# Patient Record
Sex: MALE | Race: WHITE | NOT HISPANIC OR LATINO | URBAN - METROPOLITAN AREA
[De-identification: names, ages, dates, MRNs, and addresses within clinical notes are randomized per-mention and may not be internally consistent; named-entity substitution may affect disease eponyms.]

---

## 2020-04-08 ENCOUNTER — EMERGENCY (EMERGENCY)
Facility: HOSPITAL | Age: 70
LOS: 0 days | Discharge: HOME | End: 2020-04-08
Payer: MEDICARE

## 2020-04-08 VITALS
SYSTOLIC BLOOD PRESSURE: 123 MMHG | DIASTOLIC BLOOD PRESSURE: 51 MMHG | HEART RATE: 56 BPM | OXYGEN SATURATION: 97 % | TEMPERATURE: 98 F | RESPIRATION RATE: 18 BRPM

## 2020-04-08 DIAGNOSIS — Z85.46 PERSONAL HISTORY OF MALIGNANT NEOPLASM OF PROSTATE: ICD-10-CM

## 2020-04-08 DIAGNOSIS — Z20.828 CONTACT WITH AND (SUSPECTED) EXPOSURE TO OTHER VIRAL COMMUNICABLE DISEASES: ICD-10-CM

## 2020-04-08 DIAGNOSIS — R05 COUGH: ICD-10-CM

## 2020-04-08 DIAGNOSIS — R50.9 FEVER, UNSPECIFIED: ICD-10-CM

## 2020-04-08 DIAGNOSIS — I10 ESSENTIAL (PRIMARY) HYPERTENSION: ICD-10-CM

## 2020-04-08 DIAGNOSIS — J06.9 ACUTE UPPER RESPIRATORY INFECTION, UNSPECIFIED: ICD-10-CM

## 2020-04-08 DIAGNOSIS — R09.89 OTHER SPECIFIED SYMPTOMS AND SIGNS INVOLVING THE CIRCULATORY AND RESPIRATORY SYSTEMS: ICD-10-CM

## 2020-04-08 PROCEDURE — 99282 EMERGENCY DEPT VISIT SF MDM: CPT

## 2020-04-08 PROCEDURE — 99283 EMERGENCY DEPT VISIT LOW MDM: CPT | Mod: CS

## 2020-04-08 PROCEDURE — 99283 EMERGENCY DEPT VISIT LOW MDM: CPT

## 2020-04-08 PROCEDURE — 87635 SARS-COV-2 COVID-19 AMP PRB: CPT

## 2020-04-08 NOTE — ED STATDOCS - PMH
Carpal Tunnel Syndrome    Cervical Radiculopathy    Generalized Osteoarthritis    Hypertension    Incomplete Right Bundle Branch Block    Inguinal Hernia with Gangrene, Recurrent Bilateral    Prostate Cancer    Right Inguinal Hernia

## 2020-04-08 NOTE — ED ADULT TRIAGE NOTE - CHIEF COMPLAINT QUOTE
pt presents to ED due to complaints of fever cough and possible exposure for COVID19 testing.  Patient provides verbal consent to receive results via text or email.

## 2020-04-08 NOTE — ED STATDOCS - NSFOLLOWUPINSTRUCTIONS_ED_ALL_ED_FT
How to get your Coronavirus (COVID-19) Testing Results:   Please be advised that you were tested for the coronavirus (COVID-19) in the Emergency Department at [facility].  You are to maintain self-quarantine procedures for 14 days until instructed otherwise by one of our healthcare agents. Please note that the test may take up to 2-4 days to result.  If you do not hear from us within 72 hours and you'd like to check on your results, you can call on of our coronavirus specialists at [address] [place] [address] (available 24/7).  Please DO NOT call the site where you received the test to obtain your results.     please return to the ED for severe SOB, take tylenol as needed

## 2020-04-08 NOTE — ED STATDOCS - CLINICAL SUMMARY MEDICAL DECISION MAKING FREE TEXT BOX
done
patient presents with URI symptoms, fever and concern for COVID exposure.  As patient is nontoxic appearing will test for COVID and d/c.  Quarantine reviewed and return precautions reviewed.

## 2020-04-08 NOTE — ED STATDOCS - OBJECTIVE STATEMENT
Pt presents to ED with fever, cough, runny nose, body aches, sore throat x2  days. 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

## 2020-04-08 NOTE — ED STATDOCS - PATIENT PORTAL LINK FT
You can access the FollowMyHealth Patient Portal offered by [facility] by registering at the following website: [email or web address]. By joining [facility]’s FollowMyHealth portal, you will also be able to view your health information using other applications (apps) compatible with our system.

## 2020-04-10 LAB — SARS-COV-2 RNA SPEC QL NAA+PROBE: SIGNIFICANT CHANGE UP

## 2020-11-12 ENCOUNTER — APPOINTMENT (OUTPATIENT)
Dept: CARDIOLOGY | Facility: CLINIC | Age: 70
End: 2020-11-12
Payer: MEDICARE

## 2020-11-12 ENCOUNTER — NON-APPOINTMENT (OUTPATIENT)
Age: 70
End: 2020-11-12

## 2020-11-12 VITALS
WEIGHT: 183 LBS | SYSTOLIC BLOOD PRESSURE: 144 MMHG | OXYGEN SATURATION: 99 % | BODY MASS INDEX: 26.2 KG/M2 | DIASTOLIC BLOOD PRESSURE: 71 MMHG | HEART RATE: 50 BPM | HEIGHT: 70 IN

## 2020-11-12 VITALS — SYSTOLIC BLOOD PRESSURE: 148 MMHG | DIASTOLIC BLOOD PRESSURE: 70 MMHG

## 2020-11-12 DIAGNOSIS — Z00.00 ENCOUNTER FOR GENERAL ADULT MEDICAL EXAMINATION W/OUT ABNORMAL FINDINGS: ICD-10-CM

## 2020-11-12 DIAGNOSIS — R91.8 OTHER NONSPECIFIC ABNORMAL FINDING OF LUNG FIELD: ICD-10-CM

## 2020-11-12 PROCEDURE — 93000 ELECTROCARDIOGRAM COMPLETE: CPT

## 2020-11-12 PROCEDURE — 99204 OFFICE O/P NEW MOD 45 MIN: CPT

## 2020-11-12 NOTE — REVIEW OF SYSTEMS
[Fever] : no fever [Chills] : no chills [Eyeglasses] : not currently wearing eyeglasses [Shortness Of Breath] : no shortness of breath [Chest Pain] : no chest pain [Palpitations] : no palpitations [Cough] : no cough [Heartburn] : no heartburn [Dysphagia] : no dysphagia [Urinary Frequency] : no change in urinary frequency [Impotence] : no impotence [Joint Pain] : no joint pain [Muscle Cramps] : no muscle cramps [Dizziness] : no dizziness [Convulsions] : no convulsions [Confusion] : no confusion was observed [Excessive Thirst] : no polydipsia [Easy Bleeding] : no tendency for easy bleeding

## 2020-11-12 NOTE — HISTORY OF PRESENT ILLNESS
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 patient has mild dilated aortic root which is stable on follow up CT  \par \par Patient denies any active cardiac symptoms , denies any exertional chest pain or shortness of breath , does physical work without any difficult , \par \par Patient blood pressure  is controlled   blood work showed LDL 71  HDl 44 \par \par patient had lung nodules which are stable on follow up CT  done

## 2020-11-12 NOTE — ASSESSMENT
[FreeTextEntry1] : Patient with Above hx\par \par \par Coronary atherosclerosis  : with abnormal EKG would obtain chemical nuclear stress test as patient can not run or walk fast due to ankle trauma in the past , echocardiogram to asses ventricular function \par \par Abnormal EKG   sinus bradycardia ICRBB without significant change on BB \par \par Mild MR AI TR : would obtain follow up echo to assess severity of valvular disease \par \par HTN Mild elevated  SBP encourage to follow up low salt diet , continue medication  home BP monitor recommended \par \par HLD  controlled continue current medication  LDL < 70  \par \par \par will obtain screening abdominal aorta ultrasound , carotid doppler to rule out carotid stenosis \par \par will follow up after tests \par

## 2020-11-12 NOTE — PHYSICAL EXAM
[General Appearance - Well Developed] : well developed [Normal Conjunctiva] : the conjunctiva exhibited no abnormalities [Normal Oral Mucosa] : normal oral mucosa [Normal Jugular Venous A Waves Present] : normal jugular venous A waves present [Heart Rate And Rhythm] : heart rate and rhythm were normal [Heart Sounds] : normal S1 and S2 [Murmurs] : no murmurs present [Arterial Pulses Normal] : the arterial pulses were normal [Edema] : no peripheral edema present [Veins - Varicosity Changes] : no varicosital changes were noted in the lower extremities [] : no respiratory distress [Respiration, Rhythm And Depth] : normal respiratory rhythm and effort [Exaggerated Use Of Accessory Muscles For Inspiration] : no accessory muscle use [Auscultation Breath Sounds / Voice Sounds] : lungs were clear to auscultation bilaterally [Chest Palpation] : palpation of the chest revealed no abnormalities [Lungs Percussion] : the lungs were normal to percussion [Bowel Sounds] : normal bowel sounds [Abdomen Soft] : soft [Abdomen Tenderness] : non-tender [Abdomen Mass (___ Cm)] : no abdominal mass palpated [Abnormal Walk] : normal gait [Nail Clubbing] : no clubbing of the fingernails [Cyanosis, Localized] : no localized cyanosis [Skin Color & Pigmentation] : normal skin color and pigmentation [Oriented To Time, Place, And Person] : oriented to person, place, and time [Affect] : the affect was normal

## 2020-12-01 ENCOUNTER — APPOINTMENT (OUTPATIENT)
Dept: CARDIOLOGY | Facility: CLINIC | Age: 70
End: 2020-12-01
Payer: MEDICARE

## 2020-12-01 PROCEDURE — A9500: CPT

## 2020-12-01 PROCEDURE — 93015 CV STRESS TEST SUPVJ I&R: CPT

## 2020-12-01 PROCEDURE — 78452 HT MUSCLE IMAGE SPECT MULT: CPT

## 2020-12-03 ENCOUNTER — TRANSCRIPTION ENCOUNTER (OUTPATIENT)
Age: 70
End: 2020-12-03

## 2020-12-18 ENCOUNTER — APPOINTMENT (OUTPATIENT)
Dept: CARDIOLOGY | Facility: CLINIC | Age: 70
End: 2020-12-18
Payer: MEDICARE

## 2020-12-18 PROCEDURE — 93306 TTE W/DOPPLER COMPLETE: CPT

## 2020-12-19 ENCOUNTER — APPOINTMENT (OUTPATIENT)
Dept: CARDIOLOGY | Facility: CLINIC | Age: 70
End: 2020-12-19
Payer: MEDICARE

## 2020-12-19 PROCEDURE — 93880 EXTRACRANIAL BILAT STUDY: CPT

## 2020-12-19 PROCEDURE — 93978 VASCULAR STUDY: CPT

## 2020-12-24 ENCOUNTER — TRANSCRIPTION ENCOUNTER (OUTPATIENT)
Age: 70
End: 2020-12-24

## 2020-12-27 ENCOUNTER — APPOINTMENT (OUTPATIENT)
Dept: DISASTER EMERGENCY | Facility: HOSPITAL | Age: 70
End: 2020-12-27

## 2020-12-27 ENCOUNTER — OUTPATIENT (OUTPATIENT)
Dept: OUTPATIENT SERVICES | Facility: HOSPITAL | Age: 70
LOS: 1 days | End: 2020-12-27
Payer: MEDICARE

## 2020-12-27 VITALS
OXYGEN SATURATION: 98 % | RESPIRATION RATE: 18 BRPM | TEMPERATURE: 99 F | HEART RATE: 55 BPM | DIASTOLIC BLOOD PRESSURE: 73 MMHG | SYSTOLIC BLOOD PRESSURE: 147 MMHG

## 2020-12-27 VITALS
OXYGEN SATURATION: 99 % | TEMPERATURE: 98 F | RESPIRATION RATE: 18 BRPM | SYSTOLIC BLOOD PRESSURE: 152 MMHG | HEART RATE: 60 BPM | DIASTOLIC BLOOD PRESSURE: 69 MMHG

## 2020-12-27 DIAGNOSIS — U07.1 COVID-19: ICD-10-CM

## 2020-12-27 PROCEDURE — M0239: CPT

## 2020-12-27 RX ORDER — BAMLANIVIMAB 35 MG/ML
700 INJECTION, SOLUTION INTRAVENOUS ONCE
Refills: 0 | Status: COMPLETED | OUTPATIENT
Start: 2020-12-27 | End: 2020-12-27

## 2020-12-27 RX ADMIN — BAMLANIVIMAB 200 MILLIGRAM(S): 35 INJECTION, SOLUTION INTRAVENOUS at 15:23

## 2020-12-27 NOTE — CHART NOTE - NSCHARTNOTEFT_GEN_A_CORE
CC: Monoclonal Antibody Infusion/COVID 19 Positive    70yMale with PMH HTN, prostate CA, osteoarthritis, RBBB  Symptoms: back aches, lightheadedness, nausea  Symptoms began on: 12/22/20  Positive test on: 12/22/20      Patient denies any prior COVID treatment and tx reactions  Allergies: NKDA  Medications: aspirin 81 mg po daily, HCTZ 25 MG po daily, amlodipine 10mg po daily, losartan 50mg po BID, simvastatin 20mg PO nightly, metoprolol XR 50mg po daily       exam/findings:  T(C): 36.9 (12-27-20 @ 16:30), Max: 37.4 (12-27-20 @ 15:38)  HR: 60 (12-27-20 @ 16:30) (57 - 60)  BP: 155/77 (12-27-20 @ 16:30) (149/70 - 155/77)  RR: 18 (12-27-20 @ 16:30) (18 - 18)  SpO2: 97% (12-27-20 @ 16:30) (97% - 99%)      PE:   Appearance: NAD	  HEENT:   Normal oral mucosa,   Cardiovascular: Normal S1 S2, No JVD, No murmurs, No edema  Respiratory: Lungs clear to auscultation	  Gastrointestinal:  Soft, Non-tender, + BS	  Skin: warm and dry  Neurologic: Non-focal  Extremities: Normal range of motion,    ASSESSMENT:  Pt is a 70y Male with PMH HTN, osteoarthrits, prostate ca, RBBB Covid + 12/22/20  referred by Dr Oliver who presents to infusion center for Monoclonal antibody infusion (Bamlanivimab)  Symptoms/ Criteria: body aches, nausea, nasal congestion/ age greater than 65      PLAN:  - infusion procedure explained to patient   -Consent for monoclonal antibody infusion obtained   - Risk & benifits discussed/all questions answered  -infuse  Bamlanivimab 700mg  IV over one hour   -observe patient for one hour post infusion, if stable plan to d/c home with f/u as planned per PMD      I have reviewed the Bamlanivimab Emergency Use Authorization (EUA) and I have provided the patient or patient's caregiver with the following information:      1. FDA has authorized emergency use Bamlanivimab, which is not an FDA-approved biological product.  2. The patient or patient's caregiver has the option to accept or refuse administration of Bamlanivimab.   3. The significant known and potential risks and benefits of Bamlanivimab and the extent to which such risks and benefits are unknown.  4. Information on available alternative treatments and risks and benefits of those alternatives. CC: Monoclonal Antibody Infusion/COVID 19 Positive    70yMale with PMH HTN, prostate CA, osteoarthritis, RBBB  Symptoms: back aches, lightheadedness, nausea  Symptoms began on: 12/22/20  Positive test on: 12/22/20      Patient denies any prior COVID treatment and tx reactions  Allergies: NKDA  Medications: aspirin 81 mg po daily, HCTZ 25 MG po daily, amlodipine 10mg po daily, losartan 50mg po BID, simvastatin 20mg PO nightly, metoprolol XR 50mg po daily       exam/findings:  T(C): 36.9 (12-27-20 @ 16:30), Max: 37.4 (12-27-20 @ 15:38)  HR: 60 (12-27-20 @ 16:30) (57 - 60)  BP: 155/77 (12-27-20 @ 16:30) (149/70 - 155/77)  RR: 18 (12-27-20 @ 16:30) (18 - 18)  SpO2: 97% (12-27-20 @ 16:30) (97% - 99%)      PE:   Appearance: NAD	  HEENT:   Normal oral mucosa,   Cardiovascular: Normal S1 S2, No JVD, No murmurs, No edema  Respiratory: Lungs clear to auscultation	  Gastrointestinal:  Soft, Non-tender, + BS	  Skin: warm and dry  Neurologic: Non-focal  Extremities: Normal range of motion,    ASSESSMENT:  Pt is a 70y Male with PMH HTN, osteoarthrits, prostate ca, RBBB Covid + 12/22/20  referred by Dr Oliver who presents to infusion center for Monoclonal antibody infusion (Bamlanivimab)  Symptoms/ Criteria: body aches, nausea, nasal congestion/ age greater than 65      PLAN:  - infusion procedure explained to patient   -Consent for monoclonal antibody infusion obtained   - Risk & benifits discussed/all questions answered  -infuse  Bamlanivimab 700mg  IV over one hour   -observe patient for one hour post infusion, if stable plan to d/c home with f/u as planned per PMD      I have reviewed the Bamlanivimab Emergency Use Authorization (EUA) and I have provided the patient or patient's caregiver with the following information:      1. FDA has authorized emergency use Bamlanivimab, which is not an FDA-approved biological product.  2. The patient or patient's caregiver has the option to accept or refuse administration of Bamlanivimab.   3. The significant known and potential risks and benefits of Bamlanivimab and the extent to which such risks and benefits are unknown.  4. Information on available alternative treatments and risks and benefits of those alternatives.      1736 - Bamlanivimab infusion and post infusion protocol complete  Patient tolerated well and denies any new complaints   T(C): 37.1 (12-27-20 @ 17:30), Max: 37.4 (12-27-20 @ 15:38)  HR: 55 (12-27-20 @ 17:30) (55 - 60)  BP: 147/73 (12-27-20 @ 17:30) (147/73 - 155/77)  RR: 18 (12-27-20 @ 17:30) (18 - 18)  SpO2: 98% (12-27-20 @ 17:30) (97% - 99%)      Patient tolerated infusion well and is stable for discharge home  Discharge instructions and strict return precautions reviewed with the patient who indicates understanding. All questions answered.

## 2020-12-28 ENCOUNTER — TRANSCRIPTION ENCOUNTER (OUTPATIENT)
Age: 70
End: 2020-12-28

## 2021-03-05 ENCOUNTER — NON-APPOINTMENT (OUTPATIENT)
Age: 71
End: 2021-03-05

## 2021-03-05 ENCOUNTER — APPOINTMENT (OUTPATIENT)
Dept: CARDIOLOGY | Facility: CLINIC | Age: 71
End: 2021-03-05
Payer: MEDICARE

## 2021-03-05 VITALS
WEIGHT: 180 LBS | HEART RATE: 50 BPM | SYSTOLIC BLOOD PRESSURE: 138 MMHG | DIASTOLIC BLOOD PRESSURE: 69 MMHG | TEMPERATURE: 98.2 F | HEIGHT: 70 IN | OXYGEN SATURATION: 100 % | BODY MASS INDEX: 25.77 KG/M2

## 2021-03-05 VITALS — SYSTOLIC BLOOD PRESSURE: 140 MMHG | DIASTOLIC BLOOD PRESSURE: 50 MMHG

## 2021-03-05 PROCEDURE — 93000 ELECTROCARDIOGRAM COMPLETE: CPT

## 2021-03-05 PROCEDURE — 99215 OFFICE O/P EST HI 40 MIN: CPT

## 2021-03-05 RX ORDER — LOSARTAN POTASSIUM 50 MG/1
50 TABLET, FILM COATED ORAL TWICE DAILY
Refills: 0 | Status: DISCONTINUED | COMMUNITY
End: 2021-03-05

## 2021-03-05 NOTE — ASSESSMENT
[FreeTextEntry1] : Patient with Above hx\par \par Syncopal episode in setting of alcohol intake with possible dehydrated state  ,possible  vasodepressor etiology , no evidence of immediate orthostatic hypotension ,  will obtain routine blood work ,  refer to tilt table test as patient similar prior episode , change HCTZ to 12.5 mg , decrease metoprolol to 25 mg po daily , discontinue losartan , start on valsartan 160 mg po daily , monitor bp , encourage patient to keep hydrated \par \par Coronary atherosclerosis  : normal chemical stress test , normal ventricular function \par \par Abnormal EKG   sinus bradycardia ICRBB without significant change on BB \par \par Mild MR AI TR :  mild dilated aortic size  monitor \par \par HTN Mild elevated  SBP encourage to follow up low salt diet , continue medication  home BP monitor recommended \par \par HLD  controlled continue current medication  LDL < 70  \par \par \par will obtain screening abdominal aorta ultrasound , \par

## 2021-03-05 NOTE — PHYSICAL EXAM
[General Appearance - Well Developed] : well developed [Normal Conjunctiva] : the conjunctiva exhibited no abnormalities [Normal Oral Mucosa] : normal oral mucosa [Normal Jugular Venous A Waves Present] : normal jugular venous A waves present [] : no respiratory distress [Respiration, Rhythm And Depth] : normal respiratory rhythm and effort [Exaggerated Use Of Accessory Muscles For Inspiration] : no accessory muscle use [Auscultation Breath Sounds / Voice Sounds] : lungs were clear to auscultation bilaterally [Chest Palpation] : palpation of the chest revealed no abnormalities [Lungs Percussion] : the lungs were normal to percussion [Heart Rate And Rhythm] : heart rate and rhythm were normal [Heart Sounds] : normal S1 and S2 [Murmurs] : no murmurs present [Arterial Pulses Normal] : the arterial pulses were normal [Edema] : no peripheral edema present [Veins - Varicosity Changes] : no varicosital changes were noted in the lower extremities [Bowel Sounds] : normal bowel sounds [Abdomen Soft] : soft [Abdomen Tenderness] : non-tender [Abdomen Mass (___ Cm)] : no abdominal mass palpated [Abnormal Walk] : normal gait [Nail Clubbing] : no clubbing of the fingernails [Cyanosis, Localized] : no localized cyanosis [Skin Color & Pigmentation] : normal skin color and pigmentation [Oriented To Time, Place, And Person] : oriented to person, place, and time [Affect] : the affect was normal

## 2021-03-05 NOTE — HISTORY OF PRESENT ILLNESS
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 atherosclerosis  mild dilated aortic root , coronary calcification on CT chest  who came with complain that he passed out  2 weeks ago , patient does not drink enough liquids ,  had small amount alcohol evening ,  after 20 minutes he started feeling weak and dizziness  then passed out ,  recovered immediately ,  \par \par Patient denies any associated with palpitation , chest pain or shortness of breath    patient did have similar episodes  over 20 years 4 episodes , \par \par Patient denies any active cardiac symptoms , denies any exertional chest pain or shortness of breath , does physical work without any difficult , \par \par Patient blood pressure  is controlled   blood work showed LDL 71  HDl 44 \par \par patient had lung nodules which are stable on follow up CT

## 2021-03-16 LAB
25(OH)D3 SERPL-MCNC: 34.7 NG/ML
ALBUMIN SERPL ELPH-MCNC: 4 G/DL
ALP BLD-CCNC: 90 U/L
ALT SERPL-CCNC: 18 U/L
ANION GAP SERPL CALC-SCNC: 13 MMOL/L
AST SERPL-CCNC: 25 U/L
BASOPHILS # BLD AUTO: 0.03 K/UL
BASOPHILS NFR BLD AUTO: 0.4 %
BILIRUB SERPL-MCNC: 0.4 MG/DL
BUN SERPL-MCNC: 13 MG/DL
CALCIUM SERPL-MCNC: 8.6 MG/DL
CHLORIDE SERPL-SCNC: 106 MMOL/L
CHOLEST SERPL-MCNC: 140 MG/DL
CO2 SERPL-SCNC: 23 MMOL/L
CREAT SERPL-MCNC: 0.93 MG/DL
EOSINOPHIL # BLD AUTO: 0.27 K/UL
EOSINOPHIL NFR BLD AUTO: 3.3 %
ESTIMATED AVERAGE GLUCOSE: 97 MG/DL
GLUCOSE SERPL-MCNC: 91 MG/DL
HBA1C MFR BLD HPLC: 5 %
HCT VFR BLD CALC: 38.8 %
HDLC SERPL-MCNC: 46 MG/DL
HGB BLD-MCNC: 12.7 G/DL
IMM GRANULOCYTES NFR BLD AUTO: 0.1 %
LDLC SERPL CALC-MCNC: 76 MG/DL
LYMPHOCYTES # BLD AUTO: 2.47 K/UL
LYMPHOCYTES NFR BLD AUTO: 30.1 %
MAN DIFF?: NORMAL
MCHC RBC-ENTMCNC: 31.2 PG
MCHC RBC-ENTMCNC: 32.7 GM/DL
MCV RBC AUTO: 95.3 FL
MONOCYTES # BLD AUTO: 1.19 K/UL
MONOCYTES NFR BLD AUTO: 14.5 %
NEUTROPHILS # BLD AUTO: 4.23 K/UL
NEUTROPHILS NFR BLD AUTO: 51.6 %
NONHDLC SERPL-MCNC: 94 MG/DL
PLATELET # BLD AUTO: 170 K/UL
POTASSIUM SERPL-SCNC: 4.1 MMOL/L
PROT SERPL-MCNC: 6.7 G/DL
PSA FREE FLD-MCNC: NORMAL %
PSA FREE SERPL-MCNC: <0.01 NG/ML
PSA SERPL-MCNC: <0.01 NG/ML
RBC # BLD: 4.07 M/UL
RBC # FLD: 12.8 %
SODIUM SERPL-SCNC: 142 MMOL/L
TRIGL SERPL-MCNC: 90 MG/DL
TSH SERPL-ACNC: 2.45 UIU/ML
WBC # FLD AUTO: 8.2 K/UL

## 2021-06-17 ENCOUNTER — RESULT REVIEW (OUTPATIENT)
Age: 71
End: 2021-06-17

## 2021-06-25 ENCOUNTER — APPOINTMENT (OUTPATIENT)
Dept: ELECTROPHYSIOLOGY | Facility: CLINIC | Age: 71
End: 2021-06-25
Payer: MEDICARE

## 2021-06-25 ENCOUNTER — NON-APPOINTMENT (OUTPATIENT)
Age: 71
End: 2021-06-25

## 2021-06-25 VITALS
DIASTOLIC BLOOD PRESSURE: 72 MMHG | SYSTOLIC BLOOD PRESSURE: 147 MMHG | HEART RATE: 50 BPM | BODY MASS INDEX: 24.91 KG/M2 | WEIGHT: 174 LBS | HEIGHT: 70 IN | OXYGEN SATURATION: 100 %

## 2021-06-25 DIAGNOSIS — Z78.9 OTHER SPECIFIED HEALTH STATUS: ICD-10-CM

## 2021-06-25 PROCEDURE — 99204 OFFICE O/P NEW MOD 45 MIN: CPT

## 2021-06-25 PROCEDURE — 93000 ELECTROCARDIOGRAM COMPLETE: CPT

## 2021-06-25 NOTE — HISTORY OF PRESENT ILLNESS
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 He also underwent EPS at [facility] negative conduction disease, who came with complain that he passed out  2 weeks ago , patient does not drink enough liquids ,  had small amount alcohol evening ,  after 20 minutes he started feeling weak and dizziness  then passed out ,  recovered immediately.   \par Patient denies any associated with palpitation , chest pain or shortness of breath    patient did have similar episodes  over 20 years 4 episodes , \par Patient denies any active cardiac symptoms , denies any exertional chest pain or shortness of breath , does physical work without any difficult , \par \par ECG 6/25/21 sinus christophe to 50s bpm

## 2021-06-25 NOTE — DISCUSSION/SUMMARY
[FreeTextEntry1] : 70-year-old gentleman with history of hypertension nonobstructive CAD, paroxysmal AF who has prior episodes of syncope seems to be vasovagal in nature however history is not unclear.  Long-term cardiac monitoring is reasonable option to monitor for arrhythmia symptom correlation.  Discussed Linq insertion I/R/B/a and all questions were answered.\par -chadsvasc score is 2 (age-1, htn-1) \par -cont eliquis 5mg bid\par -control HTN\par

## 2021-07-01 ENCOUNTER — OUTPATIENT (OUTPATIENT)
Dept: OUTPATIENT SERVICES | Facility: HOSPITAL | Age: 71
LOS: 1 days | Discharge: HOME | End: 2021-07-01
Payer: MEDICARE

## 2021-07-01 ENCOUNTER — TRANSCRIPTION ENCOUNTER (OUTPATIENT)
Age: 71
End: 2021-07-01

## 2021-07-01 VITALS
OXYGEN SATURATION: 99 % | RESPIRATION RATE: 16 BRPM | SYSTOLIC BLOOD PRESSURE: 155 MMHG | TEMPERATURE: 98 F | HEART RATE: 54 BPM | DIASTOLIC BLOOD PRESSURE: 65 MMHG

## 2021-07-01 VITALS
TEMPERATURE: 98 F | WEIGHT: 173.94 LBS | HEART RATE: 56 BPM | RESPIRATION RATE: 18 BRPM | SYSTOLIC BLOOD PRESSURE: 159 MMHG | OXYGEN SATURATION: 96 % | DIASTOLIC BLOOD PRESSURE: 77 MMHG | HEIGHT: 70 IN

## 2021-07-01 DIAGNOSIS — I48.0 PAROXYSMAL ATRIAL FIBRILLATION: ICD-10-CM

## 2021-07-01 PROCEDURE — 33285 INSJ SUBQ CAR RHYTHM MNTR: CPT

## 2021-07-01 PROCEDURE — C1764: CPT

## 2021-07-04 DIAGNOSIS — R55 SYNCOPE AND COLLAPSE: ICD-10-CM

## 2021-07-04 DIAGNOSIS — I77.810 THORACIC AORTIC ECTASIA: ICD-10-CM

## 2021-07-04 DIAGNOSIS — Z79.01 LONG TERM (CURRENT) USE OF ANTICOAGULANTS: ICD-10-CM

## 2021-07-04 DIAGNOSIS — R91.8 OTHER NONSPECIFIC ABNORMAL FINDING OF LUNG FIELD: ICD-10-CM

## 2021-07-04 DIAGNOSIS — I48.0 PAROXYSMAL ATRIAL FIBRILLATION: ICD-10-CM

## 2021-07-04 DIAGNOSIS — Z79.82 LONG TERM (CURRENT) USE OF ASPIRIN: ICD-10-CM

## 2021-07-04 DIAGNOSIS — I25.10 ATHEROSCLEROTIC HEART DISEASE OF NATIVE CORONARY ARTERY WITHOUT ANGINA PECTORIS: ICD-10-CM

## 2021-07-04 DIAGNOSIS — M18.11 UNILATERAL PRIMARY OSTEOARTHRITIS OF FIRST CARPOMETACARPAL JOINT, RIGHT HAND: ICD-10-CM

## 2021-07-04 DIAGNOSIS — E78.5 HYPERLIPIDEMIA, UNSPECIFIED: ICD-10-CM

## 2021-07-04 DIAGNOSIS — I10 ESSENTIAL (PRIMARY) HYPERTENSION: ICD-10-CM

## 2021-07-14 ENCOUNTER — APPOINTMENT (OUTPATIENT)
Dept: ELECTROPHYSIOLOGY | Facility: CLINIC | Age: 71
End: 2021-07-14

## 2021-07-20 ENCOUNTER — APPOINTMENT (OUTPATIENT)
Dept: ELECTROPHYSIOLOGY | Facility: CLINIC | Age: 71
End: 2021-07-20
Payer: MEDICARE

## 2021-07-20 VITALS
HEART RATE: 54 BPM | OXYGEN SATURATION: 100 % | BODY MASS INDEX: 24.91 KG/M2 | HEIGHT: 70 IN | DIASTOLIC BLOOD PRESSURE: 72 MMHG | SYSTOLIC BLOOD PRESSURE: 137 MMHG | WEIGHT: 174 LBS | RESPIRATION RATE: 14 BRPM

## 2021-07-20 PROCEDURE — 93285 PRGRMG DEV EVAL SCRMS IP: CPT

## 2021-08-23 ENCOUNTER — APPOINTMENT (OUTPATIENT)
Dept: ELECTROPHYSIOLOGY | Facility: CLINIC | Age: 71
End: 2021-08-23
Payer: MEDICARE

## 2021-08-23 ENCOUNTER — NON-APPOINTMENT (OUTPATIENT)
Age: 71
End: 2021-08-23

## 2021-08-23 PROCEDURE — 93298 REM INTERROG DEV EVAL SCRMS: CPT

## 2021-08-23 PROCEDURE — G2066: CPT

## 2021-08-30 ENCOUNTER — RX RENEWAL (OUTPATIENT)
Age: 71
End: 2021-08-30

## 2021-09-26 ENCOUNTER — APPOINTMENT (OUTPATIENT)
Dept: ELECTROPHYSIOLOGY | Facility: CLINIC | Age: 71
End: 2021-09-26
Payer: MEDICARE

## 2021-09-27 ENCOUNTER — NON-APPOINTMENT (OUTPATIENT)
Age: 71
End: 2021-09-27

## 2021-09-27 PROCEDURE — G2066: CPT

## 2021-09-27 PROCEDURE — 93298 REM INTERROG DEV EVAL SCRMS: CPT

## 2021-11-01 ENCOUNTER — APPOINTMENT (OUTPATIENT)
Dept: ELECTROPHYSIOLOGY | Facility: CLINIC | Age: 71
End: 2021-11-01
Payer: MEDICARE

## 2021-11-01 ENCOUNTER — NON-APPOINTMENT (OUTPATIENT)
Age: 71
End: 2021-11-01

## 2021-11-01 PROCEDURE — 93298 REM INTERROG DEV EVAL SCRMS: CPT

## 2021-11-01 PROCEDURE — G2066: CPT | Mod: NC

## 2021-11-05 ENCOUNTER — APPOINTMENT (OUTPATIENT)
Dept: CARDIOLOGY | Facility: CLINIC | Age: 71
End: 2021-11-05
Payer: MEDICARE

## 2021-11-05 ENCOUNTER — NON-APPOINTMENT (OUTPATIENT)
Age: 71
End: 2021-11-05

## 2021-11-05 VITALS
BODY MASS INDEX: 25.88 KG/M2 | HEIGHT: 70 IN | HEART RATE: 93 BPM | SYSTOLIC BLOOD PRESSURE: 125 MMHG | OXYGEN SATURATION: 99 % | WEIGHT: 180.78 LBS | DIASTOLIC BLOOD PRESSURE: 83 MMHG

## 2021-11-05 DIAGNOSIS — R94.31 ABNORMAL ELECTROCARDIOGRAM [ECG] [EKG]: ICD-10-CM

## 2021-11-05 PROCEDURE — 99214 OFFICE O/P EST MOD 30 MIN: CPT | Mod: 25

## 2021-11-05 PROCEDURE — 93000 ELECTROCARDIOGRAM COMPLETE: CPT

## 2021-11-05 NOTE — PHYSICAL EXAM
[General Appearance - Well Developed] : well developed [Normal Oral Mucosa] : normal oral mucosa [Normal Jugular Venous A Waves Present] : normal jugular venous A waves present [] : no respiratory distress [Respiration, Rhythm And Depth] : normal respiratory rhythm and effort [Exaggerated Use Of Accessory Muscles For Inspiration] : no accessory muscle use [Auscultation Breath Sounds / Voice Sounds] : lungs were clear to auscultation bilaterally [Chest Palpation] : palpation of the chest revealed no abnormalities [Lungs Percussion] : the lungs were normal to percussion [Heart Rate And Rhythm] : heart rate and rhythm were normal [Heart Sounds] : normal S1 and S2 [Murmurs] : no murmurs present [Arterial Pulses Normal] : the arterial pulses were normal [Edema] : no peripheral edema present [Veins - Varicosity Changes] : no varicosital changes were noted in the lower extremities [Bowel Sounds] : normal bowel sounds [Abdomen Soft] : soft [Abdomen Mass (___ Cm)] : no abdominal mass palpated [Abdomen Tenderness] : non-tender [Abnormal Walk] : normal gait [Nail Clubbing] : no clubbing of the fingernails [Cyanosis, Localized] : no localized cyanosis [Skin Color & Pigmentation] : normal skin color and pigmentation [Oriented To Time, Place, And Person] : oriented to person, place, and time [Affect] : the affect was normal [Well Developed] : well developed [Well Nourished] : well nourished [No Acute Distress] : no acute distress [Normal Conjunctiva] : normal conjunctiva [Normal Venous Pressure] : normal venous pressure [No Carotid Bruit] : no carotid bruit [No Murmur] : no murmur [No Rub] : no rub [Clear Lung Fields] : clear lung fields [Soft] : abdomen soft [Non Tender] : non-tender [No Masses/organomegaly] : no masses/organomegaly [Normal Gait] : normal gait [No Edema] : no edema [de-identified] : irreg S1,S2

## 2021-11-05 NOTE — HISTORY OF PRESENT ILLNESS
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  He states he woke up this morning feeling weak and faint.  He was informed by 's office this morning that loop monitor identified episode of afib,  Patient states he did not feel well for about a half hour.  He is feeling better now.  Offers no cp,sob,palpitations or lightheadedness.  His SBP this am was 110.  He denies drinking alcohol and remains well hydrated.  \par \par EKG: afib 102 BPM

## 2021-11-05 NOTE — ASSESSMENT
[FreeTextEntry1] : Near syncopal episode this morning: Patient noted to be in afib on EKG, rate controlled.  Recommend patient take an extra 1/2 metoprolol this morning (patient has previously been on 50mg of metoprolol however found to be bradycardic when in normal sinus rhythm).  Patient encouraged to refrain from ETOH and remain well hydrated.  Patient will follow up with  to discuss possible ablation.  \par \par Coronary atherosclerosis  : normal chemical stress test , normal ventricular function \par \par Mild MR AI TR :  mild dilated aortic size  monitor annually\par \par HTN : well controlled.  Continue BB, ARB, follow low salt diet , continue home BP monitor recommended \par \par HLD  controlled continue current medication  LDL < 70  \par \par Follow up with Dr.Palla one week \par

## 2021-11-08 ENCOUNTER — APPOINTMENT (OUTPATIENT)
Dept: CARDIOLOGY | Facility: CLINIC | Age: 71
End: 2021-11-08
Payer: MEDICARE

## 2021-11-08 VITALS
HEART RATE: 56 BPM | HEIGHT: 70 IN | DIASTOLIC BLOOD PRESSURE: 77 MMHG | OXYGEN SATURATION: 97 % | SYSTOLIC BLOOD PRESSURE: 131 MMHG | TEMPERATURE: 97.6 F

## 2021-11-08 PROCEDURE — 99214 OFFICE O/P EST MOD 30 MIN: CPT

## 2021-11-08 NOTE — ASSESSMENT
[FreeTextEntry1] : Near syncopal episode this morning: Patient noted to be in afib on EKG, rate controlled.  Recommend patient take an extra 1/2 metoprolol this morning  continue  37.5 mg po daily , (patient has previously been on 50mg of metoprolol however found to be bradycardic when in normal sinus rhythm).\par \par Patient encouraged to refrain from ETOH and remain well hydrated.  Patient will follow up with  to discuss possible ablation.  \par \par Sleep apnea  : will repeat sleep study , \par \par Coronary atherosclerosis  : normal chemical stress test , normal ventricular function \par \par Mild MR AI TR :  mild dilated aortic size  monitor annually\par \par HTN : well controlled.  Continue BB, ARB, follow low salt diet , continue home BP monitor recommended \par \par HLD  controlled continue current medication  LDL < 70  \par \par Follow up with 3 months \par

## 2021-11-08 NOTE — CARDIOLOGY SUMMARY
[de-identified] : 12/1/20 normal chemical nuclear stress test [de-identified] : 12/18/20  EF 55-60% mild dilated aortic root 3.9 cm ascending aorta 3.8 cm  mild MR

## 2021-11-08 NOTE — HISTORY OF PRESENT ILLNESS
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  His SBP this am was 110.  He denies drinking alcohol and remains well hydrated.  \par \par EKG: afib 102 BPM

## 2021-11-12 ENCOUNTER — APPOINTMENT (OUTPATIENT)
Age: 71
End: 2021-11-12
Payer: MEDICARE

## 2021-11-12 ENCOUNTER — APPOINTMENT (OUTPATIENT)
Dept: ELECTROPHYSIOLOGY | Facility: CLINIC | Age: 71
End: 2021-11-12
Payer: MEDICARE

## 2021-11-12 VITALS
HEIGHT: 70 IN | HEART RATE: 49 BPM | WEIGHT: 178 LBS | OXYGEN SATURATION: 100 % | SYSTOLIC BLOOD PRESSURE: 142 MMHG | DIASTOLIC BLOOD PRESSURE: 85 MMHG | BODY MASS INDEX: 25.48 KG/M2

## 2021-11-12 DIAGNOSIS — R55 SYNCOPE AND COLLAPSE: ICD-10-CM

## 2021-11-12 PROCEDURE — ZZZZZ: CPT

## 2021-11-12 PROCEDURE — 93285 PRGRMG DEV EVAL SCRMS IP: CPT

## 2021-11-12 PROCEDURE — 99214 OFFICE O/P EST MOD 30 MIN: CPT

## 2021-11-12 NOTE — ASSESSMENT
[FreeTextEntry1] : 71-year-old gentleman with history of hypertension dyslipidemia and nonobstructive CAD recurrent syncope paroxysmal atrial fibrillation since June 2021 requiring DC cardioversion he had EP study at [place] that was negative for SVT induction or conduction disease.  Patient has ILR.  Patient had a brief episode of rapid A. fib on 10/10/2021.  Patient had normal nuclear stress test from 2020 and that TTE that showed normal LV function mild MR.\par ILR interrogation shows  AF for 18h on 11/4/21 started at 10:53pm some RVR and avg hr 120s bpm\par PAF symptomatic will start multaq 400mg bid. Continue uninterrupted Eliquis 5 mg twice daily. take metorpolol 25mg prn for rapid af encouraged aerobic exercise. If pt has more AF episodes should consider AF ablation procedure. \par \par Total length of time spent with this patient was 30 minutes and more then half of the time was spent face to face with the patient as well as counseling and coordination of care as stated above.\par

## 2021-11-12 NOTE — CARDIOLOGY SUMMARY
[de-identified] : 12/1/20 normal chemical nuclear stress test [de-identified] : 12/18/20  EF 55-60% mild dilated aortic root 3.9 cm ascending aorta 3.8 cm  mild MR

## 2021-11-12 NOTE — HISTORY OF PRESENT ILLNESS
[FreeTextEntry1] : 71-year-old gentleman with history of hypertension dyslipidemia and nonobstructive CAD recurrent syncope paroxysmal atrial fibrillation since June 2021 requiring DC cardioversion he had EP study at [place] that was negative for SVT induction or conduction disease.  Patient has ILR. Interrogation shows AF for 18h on 11/4/21. He is compliant with eliquis 5mg bid no bleeding, he is on metoprolol 37.5mg bid. Patient had normal nuclear stress test from 2020 and that TTE that showed normal LV function mild MR.\par He is normaly sinus christophe to 50s bpm.

## 2021-11-24 ENCOUNTER — APPOINTMENT (OUTPATIENT)
Dept: CARDIOLOGY | Facility: CLINIC | Age: 71
End: 2021-11-24
Payer: MEDICARE

## 2021-11-24 VITALS
SYSTOLIC BLOOD PRESSURE: 147 MMHG | BODY MASS INDEX: 25.87 KG/M2 | WEIGHT: 180.31 LBS | DIASTOLIC BLOOD PRESSURE: 81 MMHG | OXYGEN SATURATION: 100 % | HEART RATE: 63 BPM | RESPIRATION RATE: 18 BRPM

## 2021-11-24 PROCEDURE — 99214 OFFICE O/P EST MOD 30 MIN: CPT

## 2021-11-24 PROCEDURE — 93000 ELECTROCARDIOGRAM COMPLETE: CPT

## 2021-11-24 RX ORDER — METOPROLOL TARTRATE 37.5 MG/1
37.5 TABLET, FILM COATED ORAL DAILY
Refills: 0 | Status: DISCONTINUED | COMMUNITY
End: 2021-11-24

## 2021-11-24 NOTE — CARDIOLOGY SUMMARY
[de-identified] : 12/1/20 normal chemical nuclear stress test [de-identified] : 12/18/20  EF 55-60% mild dilated aortic root 3.9 cm ascending aorta 3.8 cm  mild MR

## 2021-11-24 NOTE — HISTORY OF PRESENT ILLNESS
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 Pt followed up with recent EP evaluation with Dr. Ramirez who recommended dronedarone and possible ablation. \par \par Patient has hx of sleep apnea  his cpap remains non functional. \par \par He denies chest pain or shortness of breath. He recalls that when he lifts heavy objects or becomes stressed he has an exacerbation of atrial fibrillation.

## 2021-11-24 NOTE — REVIEW OF SYSTEMS
Critical Care Time (RN) Mins: 180 minutes for one RN and during about 90 minutes to RNs.   [Negative] : Heme/Lymph

## 2021-11-24 NOTE — DISCUSSION/SUMMARY
[Atrial Fibrillation] : atrial fibrillation [Stable] : stable [Hypertension] : hypertension [Not Responding to Treatment] : not responding to treatment [Patient] : the patient [FreeTextEntry1] : I agree with current EP plan. Explained atrial fibrillation and therapeutic alternatives including ablation. Pt will continue metoprolol succ 25 mg daily He will continue dronedarone. He will use metoprolol tartate prn atrial fibrillation. He will follow up with Dr. Palla. He will reduce simvastatin. Echo ordered to evaluate aortic dimensions. Pt will observe a low salt diet as possible.

## 2021-11-28 LAB
25(OH)D3 SERPL-MCNC: 37.1 NG/ML
ALBUMIN SERPL ELPH-MCNC: 4.2 G/DL
ALP BLD-CCNC: 83 U/L
ALT SERPL-CCNC: 17 U/L
ANION GAP SERPL CALC-SCNC: 12 MMOL/L
AST SERPL-CCNC: 25 U/L
BASOPHILS # BLD AUTO: 0.06 K/UL
BASOPHILS NFR BLD AUTO: 0.7 %
BILIRUB SERPL-MCNC: 0.7 MG/DL
BUN SERPL-MCNC: 17 MG/DL
CALCIUM SERPL-MCNC: 9.2 MG/DL
CHLORIDE SERPL-SCNC: 104 MMOL/L
CHOLEST SERPL-MCNC: 142 MG/DL
CO2 SERPL-SCNC: 24 MMOL/L
CREAT SERPL-MCNC: 1.03 MG/DL
EOSINOPHIL # BLD AUTO: 0.34 K/UL
EOSINOPHIL NFR BLD AUTO: 4 %
ESTIMATED AVERAGE GLUCOSE: 100 MG/DL
GLUCOSE SERPL-MCNC: 85 MG/DL
HBA1C MFR BLD HPLC: 5.1 %
HCT VFR BLD CALC: 42.1 %
HDLC SERPL-MCNC: 48 MG/DL
HGB BLD-MCNC: 13.9 G/DL
IMM GRANULOCYTES NFR BLD AUTO: 0.4 %
LDLC SERPL CALC-MCNC: 75 MG/DL
LYMPHOCYTES # BLD AUTO: 2.76 K/UL
LYMPHOCYTES NFR BLD AUTO: 32.4 %
MAGNESIUM SERPL-MCNC: 2.3 MG/DL
MAN DIFF?: NORMAL
MCHC RBC-ENTMCNC: 31.7 PG
MCHC RBC-ENTMCNC: 33 GM/DL
MCV RBC AUTO: 96.1 FL
MONOCYTES # BLD AUTO: 1.06 K/UL
MONOCYTES NFR BLD AUTO: 12.4 %
NEUTROPHILS # BLD AUTO: 4.27 K/UL
NEUTROPHILS NFR BLD AUTO: 50.1 %
NONHDLC SERPL-MCNC: 94 MG/DL
PLATELET # BLD AUTO: 183 K/UL
POTASSIUM SERPL-SCNC: 4.1 MMOL/L
PROT SERPL-MCNC: 6.6 G/DL
RBC # BLD: 4.38 M/UL
RBC # FLD: 13 %
SODIUM SERPL-SCNC: 140 MMOL/L
TRIGL SERPL-MCNC: 94 MG/DL
TSH SERPL-ACNC: 2.3 UIU/ML
WBC # FLD AUTO: 8.52 K/UL

## 2021-12-01 ENCOUNTER — APPOINTMENT (OUTPATIENT)
Age: 71
End: 2021-12-01
Payer: MEDICARE

## 2021-12-01 ENCOUNTER — OUTPATIENT (OUTPATIENT)
Dept: OUTPATIENT SERVICES | Facility: HOSPITAL | Age: 71
LOS: 1 days | End: 2021-12-01
Payer: MEDICARE

## 2021-12-01 DIAGNOSIS — G47.33 OBSTRUCTIVE SLEEP APNEA (ADULT) (PEDIATRIC): ICD-10-CM

## 2021-12-01 PROCEDURE — G0399: CPT | Mod: 26

## 2021-12-01 PROCEDURE — 95806 SLEEP STUDY UNATT&RESP EFFT: CPT

## 2021-12-08 ENCOUNTER — APPOINTMENT (OUTPATIENT)
Dept: ELECTROPHYSIOLOGY | Facility: CLINIC | Age: 71
End: 2021-12-08
Payer: MEDICARE

## 2021-12-08 ENCOUNTER — NON-APPOINTMENT (OUTPATIENT)
Age: 71
End: 2021-12-08

## 2021-12-08 PROCEDURE — G2066: CPT

## 2021-12-08 PROCEDURE — 93298 REM INTERROG DEV EVAL SCRMS: CPT

## 2021-12-22 ENCOUNTER — OUTPATIENT (OUTPATIENT)
Dept: OUTPATIENT SERVICES | Facility: HOSPITAL | Age: 71
LOS: 1 days | End: 2021-12-22
Payer: MEDICARE

## 2021-12-22 ENCOUNTER — APPOINTMENT (OUTPATIENT)
Age: 71
End: 2021-12-22
Payer: MEDICARE

## 2021-12-22 DIAGNOSIS — G47.33 OBSTRUCTIVE SLEEP APNEA (ADULT) (PEDIATRIC): ICD-10-CM

## 2021-12-22 PROCEDURE — 95810 POLYSOM 6/> YRS 4/> PARAM: CPT

## 2021-12-22 PROCEDURE — 95810 POLYSOM 6/> YRS 4/> PARAM: CPT | Mod: 26

## 2022-01-10 ENCOUNTER — NON-APPOINTMENT (OUTPATIENT)
Age: 72
End: 2022-01-10

## 2022-01-11 ENCOUNTER — APPOINTMENT (OUTPATIENT)
Dept: ELECTROPHYSIOLOGY | Facility: CLINIC | Age: 72
End: 2022-01-11
Payer: MEDICARE

## 2022-01-11 PROCEDURE — G2066: CPT

## 2022-01-11 PROCEDURE — 93298 REM INTERROG DEV EVAL SCRMS: CPT

## 2022-02-02 ENCOUNTER — NON-APPOINTMENT (OUTPATIENT)
Age: 72
End: 2022-02-02

## 2022-02-02 ENCOUNTER — APPOINTMENT (OUTPATIENT)
Dept: ELECTROPHYSIOLOGY | Facility: CLINIC | Age: 72
End: 2022-02-02
Payer: MEDICARE

## 2022-02-02 VITALS
RESPIRATION RATE: 14 BRPM | OXYGEN SATURATION: 100 % | SYSTOLIC BLOOD PRESSURE: 124 MMHG | HEART RATE: 53 BPM | BODY MASS INDEX: 25.77 KG/M2 | DIASTOLIC BLOOD PRESSURE: 49 MMHG | HEIGHT: 70 IN | WEIGHT: 180 LBS

## 2022-02-02 PROCEDURE — 99212 OFFICE O/P EST SF 10 MIN: CPT

## 2022-02-02 PROCEDURE — 93285 PRGRMG DEV EVAL SCRMS IP: CPT

## 2022-02-02 RX ORDER — SIMVASTATIN 20 MG/1
20 TABLET, FILM COATED ORAL DAILY
Qty: 90 | Refills: 3 | Status: DISCONTINUED | COMMUNITY
End: 2022-02-02

## 2022-02-02 RX ORDER — DOXAZOSIN 1 MG/1
1 TABLET ORAL DAILY
Refills: 0 | Status: DISCONTINUED | COMMUNITY
End: 2022-02-02

## 2022-02-02 RX ORDER — METOPROLOL TARTRATE 25 MG/1
25 TABLET, FILM COATED ORAL DAILY
Qty: 60 | Refills: 0 | Status: DISCONTINUED | COMMUNITY
Start: 2021-11-12 | End: 2022-02-02

## 2022-02-02 NOTE — ASSESSMENT
[FreeTextEntry1] : ILR reveals NSR.\par PAF burden <0.1%\par Last episode on 1/15/22 for 2 min duration.\par No other events recorded.

## 2022-02-02 NOTE — DISCUSSION/SUMMARY
[___ Month(s)] : in [unfilled] month(s) [FreeTextEntry1] : Continue Eliquis 5 mg BID, Metorpolol Succinate 12.5 mg daily, Multaq 400 mg BID.\par Continue monthly remote monitoring\par

## 2022-02-02 NOTE — HISTORY OF PRESENT ILLNESS
[FreeTextEntry1] : 71-year-old gentleman with history of hypertension dyslipidemia and nonobstructive CAD recurrent syncope paroxysmal atrial fibrillation since June 2021 requiring DC cardioversion, he had EP study at [facility] that was negative for SVT induction or conduction disease.  Patient has ILR. Interrogation shows AF for 18h on 11/4/21. He is compliant with eliquis 5mg bid no bleeding, he is on metoprolol 37.5mg bid. Patient had normal nuclear stress test from 2020 and that TTE that showed normal LV function mild MR.\par He is normally sinus christophe to 50s bpm.

## 2022-02-23 RX ORDER — METOPROLOL TARTRATE 25 MG/1
25 TABLET, FILM COATED ORAL
Refills: 0 | Status: DISCONTINUED | COMMUNITY
End: 2022-02-23

## 2022-03-08 ENCOUNTER — NON-APPOINTMENT (OUTPATIENT)
Age: 72
End: 2022-03-08

## 2022-03-08 ENCOUNTER — APPOINTMENT (OUTPATIENT)
Dept: ELECTROPHYSIOLOGY | Facility: CLINIC | Age: 72
End: 2022-03-08
Payer: MEDICARE

## 2022-03-08 PROCEDURE — G2066: CPT

## 2022-03-08 PROCEDURE — 93298 REM INTERROG DEV EVAL SCRMS: CPT

## 2022-03-23 ENCOUNTER — APPOINTMENT (OUTPATIENT)
Dept: UROLOGY | Facility: CLINIC | Age: 72
End: 2022-03-23
Payer: MEDICARE

## 2022-03-23 VITALS
BODY MASS INDEX: 26.48 KG/M2 | TEMPERATURE: 98.3 F | HEIGHT: 70 IN | OXYGEN SATURATION: 99 % | SYSTOLIC BLOOD PRESSURE: 147 MMHG | DIASTOLIC BLOOD PRESSURE: 82 MMHG | HEART RATE: 60 BPM | WEIGHT: 185 LBS

## 2022-03-23 DIAGNOSIS — Z85.46 PERSONAL HISTORY OF MALIGNANT NEOPLASM OF PROSTATE: ICD-10-CM

## 2022-03-23 DIAGNOSIS — Z80.52 FAMILY HISTORY OF MALIGNANT NEOPLASM OF BLADDER: ICD-10-CM

## 2022-03-23 DIAGNOSIS — Z86.79 PERSONAL HISTORY OF OTHER DISEASES OF THE CIRCULATORY SYSTEM: ICD-10-CM

## 2022-03-23 DIAGNOSIS — R31.0 GROSS HEMATURIA: ICD-10-CM

## 2022-03-23 PROCEDURE — 99205 OFFICE O/P NEW HI 60 MIN: CPT

## 2022-03-23 NOTE — LETTER BODY
[Dear  ___] : Dear  [unfilled], [Consult Letter:] : I had the pleasure of evaluating your patient, [unfilled]. [Please see my note below.] : Please see my note below. [Consult Closing:] : Thank you very much for allowing me to participate in the care of this patient.  If you have any questions, please do not hesitate to contact me. [Sincerely,] : Sincerely, [FreeTextEntry3] : Taj Chen MD\par

## 2022-03-23 NOTE — PHYSICAL EXAM
[General Appearance - Well Developed] : well developed [General Appearance - Well Nourished] : well nourished [Normal Appearance] : normal appearance [Well Groomed] : well groomed [General Appearance - In No Acute Distress] : no acute distress [Edema] : no peripheral edema [Respiration, Rhythm And Depth] : normal respiratory rhythm and effort [Exaggerated Use Of Accessory Muscles For Inspiration] : no accessory muscle use [Abdomen Soft] : soft [Abdomen Tenderness] : non-tender [Costovertebral Angle Tenderness] : no ~M costovertebral angle tenderness [Penis Abnormality] : normal circumcised penis [Urinary Bladder Findings] : the bladder was normal on palpation [Scrotum] : the scrotum was normal [Epididymis] : the epididymides were normal [Testes Tenderness] : no tenderness of the testes [Testes Mass (___cm)] : there were no testicular masses [No Prostate Nodules] : no prostate nodules [Normal Station and Gait] : the gait and station were normal for the patient's age [] : no rash [No Focal Deficits] : no focal deficits [Oriented To Time, Place, And Person] : oriented to person, place, and time [Affect] : the affect was normal [Mood] : the mood was normal [Not Anxious] : not anxious [No Palpable Adenopathy] : no palpable adenopathy [FreeTextEntry1] : coronal Hypospadius, meatal stenosis

## 2022-03-23 NOTE — REVIEW OF SYSTEMS
[Palpitations] : palpitations [Seen by urologist before (Name)  ___] : Preciously seen by a urologist: [unfilled] [Blood in urine that you can see] : blood visible in urine [Negative] : Heme/Lymph

## 2022-03-23 NOTE — HISTORY OF PRESENT ILLNESS
[FreeTextEntry1] : 3/23/2022: Charles Bardales is a 71 year old male that has terminal gross painless intermittent hematuria for the last one year. 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 . 6 months ago A-Fib and started on Eliquis. 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 cystic mass adjacent to the left lateral wall of the bladder; possible lymphocele. Voiding well; has coronal Hypospadius with materies stenosis. Urine cytology negative for high grade malignancy. Will schedule for meatal dilation, cystoscopy and possible biopsy and fulguration in 2 months. Patient is visiting [facility]. Will stop Eliquis 3 days prior to procedure.

## 2022-04-06 ENCOUNTER — APPOINTMENT (OUTPATIENT)
Dept: UROLOGY | Facility: CLINIC | Age: 72
End: 2022-04-06
Payer: MEDICARE

## 2022-04-06 VITALS
HEART RATE: 60 BPM | TEMPERATURE: 98.3 F | OXYGEN SATURATION: 99 % | DIASTOLIC BLOOD PRESSURE: 65 MMHG | SYSTOLIC BLOOD PRESSURE: 136 MMHG

## 2022-04-06 PROCEDURE — 99213 OFFICE O/P EST LOW 20 MIN: CPT | Mod: 25

## 2022-04-06 PROCEDURE — 52224Z: CUSTOM

## 2022-04-06 NOTE — HISTORY OF PRESENT ILLNESS
[FreeTextEntry1] : 3/23/2022: Charles Bardales is a 71 year old male that has terminal gross painless intermittent hematuria for the last one year. 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 . 6 months ago A-Fib and started on Eliquis. 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 cystic mass adjacent to the left lateral wall of the bladder; possible lymphocele. Voiding well; has coronal Hypospadius with materies stenosis. Urine cytology negative for high grade malignancy. Will schedule for meatal dilation, cystoscopy and possible biopsy and fulguration in 2 months. Patient is visiting [facility]. Will stop Eliquis 3 days prior to procedure.

## 2022-04-06 NOTE — PHYSICAL EXAM
[General Appearance - Well Developed] : well developed [General Appearance - Well Nourished] : well nourished [Normal Appearance] : normal appearance [Well Groomed] : well groomed [General Appearance - In No Acute Distress] : no acute distress [Abdomen Soft] : soft [Abdomen Tenderness] : non-tender [Costovertebral Angle Tenderness] : no ~M costovertebral angle tenderness [Penis Abnormality] : normal circumcised penis [Urinary Bladder Findings] : the bladder was normal on palpation [Scrotum] : the scrotum was normal [Epididymis] : the epididymides were normal [Testes Tenderness] : no tenderness of the testes [Testes Mass (___cm)] : there were no testicular masses [No Prostate Nodules] : no prostate nodules [Edema] : no peripheral edema [] : no respiratory distress [Respiration, Rhythm And Depth] : normal respiratory rhythm and effort [Exaggerated Use Of Accessory Muscles For Inspiration] : no accessory muscle use [Oriented To Time, Place, And Person] : oriented to person, place, and time [Affect] : the affect was normal [Mood] : the mood was normal [Not Anxious] : not anxious [Normal Station and Gait] : the gait and station were normal for the patient's age [No Focal Deficits] : no focal deficits [No Palpable Adenopathy] : no palpable adenopathy [FreeTextEntry1] : coronal Hypospadius, meatal stenosis

## 2022-04-08 LAB — CORE LAB BIOPSY: NORMAL

## 2022-04-12 ENCOUNTER — NON-APPOINTMENT (OUTPATIENT)
Age: 72
End: 2022-04-12

## 2022-04-12 ENCOUNTER — APPOINTMENT (OUTPATIENT)
Dept: ELECTROPHYSIOLOGY | Facility: CLINIC | Age: 72
End: 2022-04-12
Payer: MEDICARE

## 2022-04-12 PROCEDURE — G2066: CPT

## 2022-04-12 PROCEDURE — 93298 REM INTERROG DEV EVAL SCRMS: CPT

## 2022-05-01 ENCOUNTER — NON-APPOINTMENT (OUTPATIENT)
Age: 72
End: 2022-05-01

## 2022-05-09 ENCOUNTER — APPOINTMENT (OUTPATIENT)
Dept: CARDIOLOGY | Facility: CLINIC | Age: 72
End: 2022-05-09
Payer: MEDICARE

## 2022-05-09 ENCOUNTER — OUTPATIENT (OUTPATIENT)
Dept: OUTPATIENT SERVICES | Facility: HOSPITAL | Age: 72
LOS: 1 days | Discharge: HOME | End: 2022-05-09
Payer: MEDICARE

## 2022-05-09 VITALS
HEIGHT: 70 IN | RESPIRATION RATE: 14 BRPM | HEART RATE: 64 BPM | TEMPERATURE: 98.2 F | BODY MASS INDEX: 26.2 KG/M2 | WEIGHT: 183 LBS | DIASTOLIC BLOOD PRESSURE: 73 MMHG | OXYGEN SATURATION: 97 % | SYSTOLIC BLOOD PRESSURE: 129 MMHG

## 2022-05-09 VITALS
TEMPERATURE: 97 F | OXYGEN SATURATION: 98 % | WEIGHT: 183.65 LBS | DIASTOLIC BLOOD PRESSURE: 63 MMHG | RESPIRATION RATE: 17 BRPM | HEART RATE: 64 BPM | SYSTOLIC BLOOD PRESSURE: 126 MMHG | HEIGHT: 70 IN

## 2022-05-09 DIAGNOSIS — Z01.810 ENCOUNTER FOR PREPROCEDURAL CARDIOVASCULAR EXAMINATION: ICD-10-CM

## 2022-05-09 DIAGNOSIS — Z98.890 OTHER SPECIFIED POSTPROCEDURAL STATES: Chronic | ICD-10-CM

## 2022-05-09 DIAGNOSIS — Z01.818 ENCOUNTER FOR OTHER PREPROCEDURAL EXAMINATION: ICD-10-CM

## 2022-05-09 DIAGNOSIS — C67.9 MALIGNANT NEOPLASM OF BLADDER, UNSPECIFIED: ICD-10-CM

## 2022-05-09 LAB
ANION GAP SERPL CALC-SCNC: 5 MMOL/L — SIGNIFICANT CHANGE UP (ref 5–17)
APPEARANCE UR: ABNORMAL
APTT BLD: 35.7 SEC — HIGH (ref 27.5–35.5)
BASOPHILS # BLD AUTO: 0.03 K/UL — SIGNIFICANT CHANGE UP (ref 0–0.2)
BASOPHILS NFR BLD AUTO: 0.4 % — SIGNIFICANT CHANGE UP (ref 0–2)
BILIRUB UR-MCNC: NEGATIVE — SIGNIFICANT CHANGE UP
BUN SERPL-MCNC: 18 MG/DL — SIGNIFICANT CHANGE UP (ref 7–23)
CALCIUM SERPL-MCNC: 8.8 MG/DL — SIGNIFICANT CHANGE UP (ref 8.5–10.1)
CHLORIDE SERPL-SCNC: 106 MMOL/L — SIGNIFICANT CHANGE UP (ref 96–108)
CO2 SERPL-SCNC: 29 MMOL/L — SIGNIFICANT CHANGE UP (ref 22–31)
COLOR SPEC: ABNORMAL
CREAT SERPL-MCNC: 0.95 MG/DL — SIGNIFICANT CHANGE UP (ref 0.5–1.3)
DIFF PNL FLD: ABNORMAL
EGFR: 85 ML/MIN/1.73M2 — SIGNIFICANT CHANGE UP
EOSINOPHIL # BLD AUTO: 0.31 K/UL — SIGNIFICANT CHANGE UP (ref 0–0.5)
EOSINOPHIL NFR BLD AUTO: 4.1 % — SIGNIFICANT CHANGE UP (ref 0–6)
FLUAV AG NPH QL: SIGNIFICANT CHANGE UP
FLUBV AG NPH QL: SIGNIFICANT CHANGE UP
GLUCOSE SERPL-MCNC: 80 MG/DL — SIGNIFICANT CHANGE UP (ref 70–99)
GLUCOSE UR QL: NEGATIVE MG/DL — SIGNIFICANT CHANGE UP
HCT VFR BLD CALC: 36.9 % — LOW (ref 39–50)
HGB BLD-MCNC: 12.5 G/DL — LOW (ref 13–17)
IMM GRANULOCYTES NFR BLD AUTO: 0.3 % — SIGNIFICANT CHANGE UP (ref 0–1.5)
INR BLD: 1.14 RATIO — SIGNIFICANT CHANGE UP (ref 0.88–1.16)
KETONES UR-MCNC: ABNORMAL
LEUKOCYTE ESTERASE UR-ACNC: ABNORMAL
LYMPHOCYTES # BLD AUTO: 2.43 K/UL — SIGNIFICANT CHANGE UP (ref 1–3.3)
LYMPHOCYTES # BLD AUTO: 32.3 % — SIGNIFICANT CHANGE UP (ref 13–44)
MCHC RBC-ENTMCNC: 31.3 PG — SIGNIFICANT CHANGE UP (ref 27–34)
MCHC RBC-ENTMCNC: 33.9 G/DL — SIGNIFICANT CHANGE UP (ref 32–36)
MCV RBC AUTO: 92.3 FL — SIGNIFICANT CHANGE UP (ref 80–100)
MONOCYTES # BLD AUTO: 0.87 K/UL — SIGNIFICANT CHANGE UP (ref 0–0.9)
MONOCYTES NFR BLD AUTO: 11.6 % — SIGNIFICANT CHANGE UP (ref 2–14)
NEUTROPHILS # BLD AUTO: 3.87 K/UL — SIGNIFICANT CHANGE UP (ref 1.8–7.4)
NEUTROPHILS NFR BLD AUTO: 51.3 % — SIGNIFICANT CHANGE UP (ref 43–77)
NITRITE UR-MCNC: NEGATIVE — SIGNIFICANT CHANGE UP
NRBC # BLD: 0 /100 WBCS — SIGNIFICANT CHANGE UP (ref 0–0)
PH UR: 5 — SIGNIFICANT CHANGE UP (ref 5–8)
PLATELET # BLD AUTO: 180 K/UL — SIGNIFICANT CHANGE UP (ref 150–400)
POTASSIUM SERPL-MCNC: 3.6 MMOL/L — SIGNIFICANT CHANGE UP (ref 3.5–5.3)
POTASSIUM SERPL-SCNC: 3.6 MMOL/L — SIGNIFICANT CHANGE UP (ref 3.5–5.3)
PROT UR-MCNC: 100 MG/DL
PROTHROM AB SERPL-ACNC: 13.7 SEC — HIGH (ref 10.5–13.4)
RBC # BLD: 4 M/UL — LOW (ref 4.2–5.8)
RBC # FLD: 12.3 % — SIGNIFICANT CHANGE UP (ref 10.3–14.5)
SARS-COV-2 RNA SPEC QL NAA+PROBE: SIGNIFICANT CHANGE UP
SODIUM SERPL-SCNC: 140 MMOL/L — SIGNIFICANT CHANGE UP (ref 135–145)
SP GR SPEC: 1.02 — SIGNIFICANT CHANGE UP (ref 1.01–1.02)
UROBILINOGEN FLD QL: NEGATIVE MG/DL — SIGNIFICANT CHANGE UP
WBC # BLD: 7.53 K/UL — SIGNIFICANT CHANGE UP (ref 3.8–10.5)
WBC # FLD AUTO: 7.53 K/UL — SIGNIFICANT CHANGE UP (ref 3.8–10.5)

## 2022-05-09 PROCEDURE — 99215 OFFICE O/P EST HI 40 MIN: CPT

## 2022-05-09 PROCEDURE — 93010 ELECTROCARDIOGRAM REPORT: CPT

## 2022-05-09 RX ORDER — HYDROCHLOROTHIAZIDE 25 MG/1
25 TABLET ORAL DAILY
Qty: 90 | Refills: 1 | Status: ACTIVE | COMMUNITY

## 2022-05-09 RX ORDER — DOXAZOSIN 2 MG/1
2 TABLET ORAL
Qty: 180 | Refills: 0 | Status: ACTIVE | COMMUNITY

## 2022-05-09 RX ORDER — VALSARTAN 160 MG/1
160 TABLET, COATED ORAL
Qty: 180 | Refills: 2 | Status: ACTIVE | COMMUNITY
Start: 2021-03-05

## 2022-05-09 RX ORDER — DRONEDARONE 400 MG/1
400 TABLET, FILM COATED ORAL TWICE DAILY
Qty: 180 | Refills: 1 | Status: ACTIVE | COMMUNITY
Start: 2021-11-12

## 2022-05-09 RX ORDER — APIXABAN 5 MG/1
5 TABLET, FILM COATED ORAL
Qty: 180 | Refills: 1 | Status: ACTIVE | COMMUNITY

## 2022-05-09 RX ORDER — SIMVASTATIN 10 MG/1
10 TABLET, FILM COATED ORAL DAILY
Qty: 90 | Refills: 3 | Status: ACTIVE | COMMUNITY

## 2022-05-09 NOTE — H&P PST ADULT - EYES
69 year old male who sustained a right hip fracture s/p right hip hermiathroplasty on February 5th who developed colonic ileus. His symptom have waxed and waned but continues to have abdominal distension prompting colorectal surgery consult. He has undergone evaluation with CT scans as well as barium enema which shows no evidence of obstruction. He also responded to prior does of neostigmine. He just returned from colonoscopy with decompression and placement of a rectal tube. He denies abdominal pain and reports improvement in distension.     PAST MEDICAL & SURGICAL HISTORY:  Spinal stenosis  CHF (congestive heart failure)  Emphysema  COPD (chronic obstructive pulmonary disease)  Right hip surgery    MEDICATIONS  (STANDING):  amLODIPine   Tablet 5 milliGRAM(s) Oral daily  ascorbic acid 500 milliGRAM(s) Oral two times a day  atorvastatin 20 milliGRAM(s) Oral at bedtime  ciprofloxacin   IVPB 400 milliGRAM(s) IV Intermittent every 12 hours  ciprofloxacin   IVPB      dextrose 5% + sodium chloride 0.45%. 1000 milliLiter(s) (100 mL/Hr) IV Continuous <Continuous>  docusate sodium 100 milliGRAM(s) Oral three times a day  enoxaparin Injectable 40 milliGRAM(s) SubCutaneous every 24 hours  finasteride 5 milliGRAM(s) Oral daily  folic acid 1 milliGRAM(s) Oral daily  isosorbide   mononitrate ER Tablet (IMDUR) 60 milliGRAM(s) Oral daily  lidocaine   Patch 1 Patch Transdermal daily  lisinopril 20 milliGRAM(s) Oral daily  metoprolol succinate ER 25 milliGRAM(s) Oral daily  metroNIDAZOLE  IVPB 500 milliGRAM(s) IV Intermittent every 8 hours  multivitamin 1 Tablet(s) Oral daily  pantoprazole    Tablet 40 milliGRAM(s) Oral daily  ranolazine 500 milliGRAM(s) Oral two times a day  tamsulosin 0.4 milliGRAM(s) Oral at bedtime  tiotropium 18 MICROgram(s) Capsule 1 Capsule(s) Inhalation daily    Allergies: No Known Allergies    Exam:  Vital Signs Last 24 Hrs  T(C): 36.6 (13 Feb 2018 11:00), Max: 36.9 (12 Feb 2018 19:17)  T(F): 97.8 (13 Feb 2018 11:00), Max: 98.5 (12 Feb 2018 19:17)  HR: 96 (13 Feb 2018 11:00) (84 - 98)  BP: 115/60 (13 Feb 2018 11:00) (115/60 - 136/62)  BP(mean): 74 (13 Feb 2018 11:00) (68 - 76)  RR: 17 (13 Feb 2018 11:00) (17 - 22)  SpO2: 95% (13 Feb 2018 11:00) (91% - 95%)    General: in no distress  HEENT: normocephalic, atraumatic  Respiratory: Non labored breathing  Cardiovascular: regular rate and rhythm  Abdomen:  Rectal: rectal tube in place  Extremities  Skin: warm and dry  Neuro: alert and oriented x 3                          11.9   23.7  )-----------( 611      ( 13 Feb 2018 07:42 )             36.7   02-13    137  |  99  |  22  ----------------------------<  131<H>  3.8   |  33<H>  |  0.63    Ca    8.7      13 Feb 2018 07:42    TPro  5.8<L>  /  Alb  1.9<L>  /  TBili  0.4  /  DBili  x   /  AST  36  /  ALT  29  /  AlkPhos  68  02-13    < from: CT Abdomen and Pelvis No Cont (02.10.18 @ 17:13) >    PROCEDURE DATE:  02/10/2018      INTERPRETATION:  CT ABDOMEN AND PELVIS    HISTORY: Generalized Abdominal Pain and distention, right lower quadrant pain    Contrast:     None    Comparison: CT abdomen and pelvis 2/7/2018    Findings:  LIVER: Normal.  SPLEEN: Normal.  PANCREAS: Normal.  GALLBLADDER/BILIARY TREE: Nondilated. Normal gallbladder.  ADRENALS: Normal.  KIDNEYS: No hydronephrosis or urinary tract calculi. Bilateral intrarenal vascular calcification.  LYMPHADENOPATHY/RETROPERITONEUM: No adenopathy.  VASCULATURE: Aortoiliac atherosclerosis without aneurysm.  BOWEL:No dilated small bowel loops. Normal appendix. Unchanged diffuse gaseous distention of the colon without interval change. There is residual Gastrografin throughout the colon from 2/8/2018. Some of the Gastrografin has refluxed into the ascending colon and cecum. No stricture or evidence of colonic obstruction.  PELVIC VISCERA: Unremarkable prostate, seminal vesicles, and urinary bladder.  PELVIC LYMPH NODES: No pelvic adenopathy.  PERITONEUM/ABDOMINAL WALL: Stable trace bilateral paracolic gutter ascites. No free air.  SKELETAL: No acute bony abnormality. Right hip arthroplasty again noted. Decreasing right hip subcutaneous air.  LUNG BASES: Bibasilar atelectasis.    IMPRESSION:     Stable colonic ileus with no interval change in degree of gaseous distention. Persistent retention of Gastrografin from an enema performed 2 days prior. Gastrografin has refluxed to the level of the ascending colon/cecum.    < end of copied text >    < from: Xray Abdomen 2 Views (02.13.18 @ 11:25) >    EXAM:  XR ABDOMEN 2V                            PROCEDURE DATE:  02/13/2018      INTERPRETATION:  Abdomen radiographs      CPT 48165    CLINICAL INFORMATION:       Abdominal distention, follow-up.    TECHNIQUE:  Supine views of the abdomen wereobtained.    FINDINGS:   Prior study dated 2/7/2018 was available for review.    Degenerative changes are noted in the lumbar spine. There is partial visualization of a total right hip replacement. Moderate colonic distention with air is again noted without significant change from prior   examination. There are several mildly dilated air-filled loops of small bowel noted in the left upper and left lower quadrants. This represents an interval change from prior examination. No abnormal softtissue   calcification is seen.    IMPRESSION:  Findings suggestive of colonic ileus without significant change from prior study dated 2/7/2018. Several mildly dilated air-filled loops of small bowel are present in the left abdomen, as well which represents an interval change from prior study dated 2/7/2018. Follow-up examination is as clinically indicated.      < end of copied text > 69 year old male who sustained a right hip fracture s/p right hip hermiathroplasty on February 5th who developed colonic ileus. His symptom have waxed and waned but continues to have abdominal distension prompting colorectal surgery consult. He has undergone evaluation with CT scans as well as barium enema which shows no evidence of obstruction. He also responded to prior does of neostigmine. He just returned from colonoscopy with decompression and placement of a rectal tube. He denies abdominal pain and reports improvement in distension.     PAST MEDICAL & SURGICAL HISTORY:  Spinal stenosis  CHF (congestive heart failure)  Emphysema  COPD (chronic obstructive pulmonary disease)  Right hip surgery    MEDICATIONS  (STANDING):  amLODIPine   Tablet 5 milliGRAM(s) Oral daily  ascorbic acid 500 milliGRAM(s) Oral two times a day  atorvastatin 20 milliGRAM(s) Oral at bedtime  ciprofloxacin   IVPB 400 milliGRAM(s) IV Intermittent every 12 hours  ciprofloxacin   IVPB      dextrose 5% + sodium chloride 0.45%. 1000 milliLiter(s) (100 mL/Hr) IV Continuous <Continuous>  docusate sodium 100 milliGRAM(s) Oral three times a day  enoxaparin Injectable 40 milliGRAM(s) SubCutaneous every 24 hours  finasteride 5 milliGRAM(s) Oral daily  folic acid 1 milliGRAM(s) Oral daily  isosorbide   mononitrate ER Tablet (IMDUR) 60 milliGRAM(s) Oral daily  lidocaine   Patch 1 Patch Transdermal daily  lisinopril 20 milliGRAM(s) Oral daily  metoprolol succinate ER 25 milliGRAM(s) Oral daily  metroNIDAZOLE  IVPB 500 milliGRAM(s) IV Intermittent every 8 hours  multivitamin 1 Tablet(s) Oral daily  pantoprazole    Tablet 40 milliGRAM(s) Oral daily  ranolazine 500 milliGRAM(s) Oral two times a day  tamsulosin 0.4 milliGRAM(s) Oral at bedtime  tiotropium 18 MICROgram(s) Capsule 1 Capsule(s) Inhalation daily    Allergies: No Known Allergies    Exam:  Vital Signs Last 24 Hrs  T(C): 36.6 (13 Feb 2018 11:00), Max: 36.9 (12 Feb 2018 19:17)  T(F): 97.8 (13 Feb 2018 11:00), Max: 98.5 (12 Feb 2018 19:17)  HR: 96 (13 Feb 2018 11:00) (84 - 98)  BP: 115/60 (13 Feb 2018 11:00) (115/60 - 136/62)  BP(mean): 74 (13 Feb 2018 11:00) (68 - 76)  RR: 17 (13 Feb 2018 11:00) (17 - 22)  SpO2: 95% (13 Feb 2018 11:00) (91% - 95%)    General: in no distress  HEENT: normocephalic, atraumatic  Respiratory: Non labored breathing  Cardiovascular: regular rate and rhythm  Abdomen: soft, non tender, distended  Rectal: rectal tube in place  Extremities: Right hip incision, intact  Skin: warm and dry  Neuro: alert and oriented x 3                          11.9   23.7  )-----------( 611      ( 13 Feb 2018 07:42 )             36.7   02-13    137  |  99  |  22  ----------------------------<  131<H>  3.8   |  33<H>  |  0.63    Ca    8.7      13 Feb 2018 07:42    TPro  5.8<L>  /  Alb  1.9<L>  /  TBili  0.4  /  DBili  x   /  AST  36  /  ALT  29  /  AlkPhos  68  02-13    < from: CT Abdomen and Pelvis No Cont (02.10.18 @ 17:13) >    PROCEDURE DATE:  02/10/2018      INTERPRETATION:  CT ABDOMEN AND PELVIS    HISTORY: Generalized Abdominal Pain and distention, right lower quadrant pain    Contrast:     None    Comparison: CT abdomen and pelvis 2/7/2018    Findings:  LIVER: Normal.  SPLEEN: Normal.  PANCREAS: Normal.  GALLBLADDER/BILIARY TREE: Nondilated. Normal gallbladder.  ADRENALS: Normal.  KIDNEYS: No hydronephrosis or urinary tract calculi. Bilateral intrarenal vascular calcification.  LYMPHADENOPATHY/RETROPERITONEUM: No adenopathy.  VASCULATURE: Aortoiliac atherosclerosis without aneurysm.  BOWEL:No dilated small bowel loops. Normal appendix. Unchanged diffuse gaseous distention of the colon without interval change. There is residual Gastrografin throughout the colon from 2/8/2018. Some of the Gastrografin has refluxed into the ascending colon and cecum. No stricture or evidence of colonic obstruction.  PELVIC VISCERA: Unremarkable prostate, seminal vesicles, and urinary bladder.  PELVIC LYMPH NODES: No pelvic adenopathy.  PERITONEUM/ABDOMINAL WALL: Stable trace bilateral paracolic gutter ascites. No free air.  SKELETAL: No acute bony abnormality. Right hip arthroplasty again noted. Decreasing right hip subcutaneous air.  LUNG BASES: Bibasilar atelectasis.    IMPRESSION:     Stable colonic ileus with no interval change in degree of gaseous distention. Persistent retention of Gastrografin from an enema performed 2 days prior. Gastrografin has refluxed to the level of the ascending colon/cecum.    < end of copied text >    < from: Xray Abdomen 2 Views (02.13.18 @ 11:25) >    EXAM:  XR ABDOMEN 2V                            PROCEDURE DATE:  02/13/2018      INTERPRETATION:  Abdomen radiographs      CPT 86369    CLINICAL INFORMATION:       Abdominal distention, follow-up.    TECHNIQUE:  Supine views of the abdomen wereobtained.    FINDINGS:   Prior study dated 2/7/2018 was available for review.    Degenerative changes are noted in the lumbar spine. There is partial visualization of a total right hip replacement. Moderate colonic distention with air is again noted without significant change from prior   examination. There are several mildly dilated air-filled loops of small bowel noted in the left upper and left lower quadrants. This represents an interval change from prior examination. No abnormal softtissue   calcification is seen.    IMPRESSION:  Findings suggestive of colonic ileus without significant change from prior study dated 2/7/2018. Several mildly dilated air-filled loops of small bowel are present in the left abdomen, as well which represents an interval change from prior study dated 2/7/2018. Follow-up examination is as clinically indicated.      < end of copied text > EOMI; PERRL; no drainage or redness

## 2022-05-09 NOTE — H&P PST ADULT - ASSESSMENT
bladder tumor/ hematuria  CAPRINI SCORE    AGE RELATED RISK FACTORS                                                       MOBILITY RELATED FACTORS  [ ] Age 41-60 years                                            (1 Point)                  [ ] Bed rest                                                        (1 Point)  [x ] Age: 61-74 years                                           (2 Points)                [ ] Plaster cast                                                   (2 Points)  [ ] Age= 75 years                                              (3 Points)                 [ ] Bed bound for more than 72 hours                   (2 Points)    DISEASE RELATED RISK FACTORS                                               GENDER SPECIFIC FACTORS  [ ] Edema in the lower extremities                       (1 Point)                  [ ] Pregnancy                                                     (1 Point)  [ ] Varicose veins                                               (1 Point)                  [ ] Post-partum < 6 weeks                                   (1 Point)             [x ] BMI > 25 Kg/m2                                            (1 Point)                  [ ] Hormonal therapy  or oral contraception            (1 Point)                 [ ] Sepsis (in the previous month)                        (1 Point)                  [ ] History of pregnancy complications  [ ] Pneumonia or serious lung disease                                               [ ] Unexplained or recurrent                       (1 Point)           (in the previous month)                               (1 Point)  [ ] Abnormal pulmonary function test                     (1 Point)                 SURGERY RELATED RISK FACTORS  [ ] Acute myocardial infarction                              (1 Point)                 [ ]  Section                                            (1 Point)  [ ] Congestive heart failure (in the previous month)  (1 Point)                 [ x] Minor surgery                                                 (1 Point)   [ ] Inflammatory bowel disease                             (1 Point)                 [ ] Arthroscopic surgery                                        (2 Points)  [ ] Central venous access                                    (2 Points)                [ ] General surgery lasting more than 45 minutes   (2 Points)       [ ] Stroke (in the previous month)                          (5 Points)               [ ] Elective arthroplasty                                        (5 Points)                                                                                                                                               HEMATOLOGY RELATED FACTORS                                                 TRAUMA RELATED RISK FACTORS  [ ] Prior episodes of VTE                                     (3 Points)                 [ ] Fracture of the hip, pelvis, or leg                       (5 Points)  [ ] Positive family history for VTE                         (3 Points)                 [ ] Acute spinal cord injury (in the previous month)  (5 Points)  [ ] Prothrombin 81721 A                                      (3 Points)                 [ ] Paralysis  (less than 1 month)                          (5 Points)  [ ] Factor V Leiden                                             (3 Points)                 [ ] Multiple Trauma within 1 month                         (5 Points)  [ ] Lupus anticoagulants                                     (3 Points)                                                           [ ] Anticardiolipin antibodies                                (3 Points)                                                       [ ] High homocysteine in the blood                      (3 Points)                                             [ ] Other congenital or acquired thrombophilia       (3 Points)                                                [ ] Heparin induced thrombocytopenia                  (3 Points)                                          Total Score [       4   ]  Caprini Score 0-2: Low risk, No VTE Prophylaxis required for most patient's, encourage ambulation  Caprini Score 3-6: At Risk, Pharmacologic VTE prophylaxis is indicated for most patients ( in the absence of a contraindication)  Caprini Score Greater than or = 7: High Risk , pharmacologic VTE is indicated for most patients ( in the absence of a contraindication)    Caprini score indicates that the patient is high risk for VTE event ( score 6 or greater). Surgical patient's in this group will benefit from both pharmacologic prophylaxis and intermittent compression devices . Surgical team will determine the balance between VTE  risk and bleeding risk and other clinical considerations

## 2022-05-09 NOTE — CARDIOLOGY SUMMARY
[de-identified] : 5/9/22 sinus bradycardia 54 BPM normal QT  ( out side ekg reviewed ) [de-identified] : 12/1/20 normal chemical nuclear stress test [de-identified] : 12/18/20  EF 55-60% mild dilated aortic root 3.9 cm ascending aorta 3.8 cm  mild MR

## 2022-05-09 NOTE — H&P PST ADULT - NSICDXPASTMEDICALHX_GEN_ALL_CORE_FT
PAST MEDICAL HISTORY:  Afib     Carpal Tunnel Syndrome     Cervical Radiculopathy     Generalized Osteoarthritis     Hypertension     Incomplete Right Bundle Branch Block     Inguinal Hernia with Gangrene, Recurrent Bilateral     Prostate Cancer     Right Inguinal Hernia      PAST MEDICAL HISTORY:  Afib     Carpal Tunnel Syndrome     Cervical Radiculopathy     Generalized Osteoarthritis     HLD (hyperlipidemia)     Hypertension     Incomplete Right Bundle Branch Block     Inguinal Hernia with Gangrene, Recurrent Bilateral     Prostate Cancer     Right Inguinal Hernia

## 2022-05-09 NOTE — HISTORY OF PRESENT ILLNESS
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  HTN HLD coronary atherosclerosis  mild dilated aortic root , coronary calcification on CT chest   syncope  negatvie EPS at [facility] 6 months ago  s/p ILR implantation 7/2021 came for pre op cardiac evaluation for cystoscopy for possible bladder malignancy .\par \par Patient is having gross hematuria , he decreased dose to 2.5 mg p BID eliquis \par \par Patient remain in sinus rhythm on Multaq , his blood pressure is controlled current dose of medication \par \par Patient denies any chest pain or shortness of breath or palpitation , patient is physically active , \par \par Patient has hx of sleep apnea  his cpap is broken ,     his blood pressure is controlled  patient had normal lipid profile \par \par ekg showed in sinus bradycardia , reviewed

## 2022-05-09 NOTE — H&P PST ADULT - PROBLEM SELECTOR PLAN 5
Preop instructions provided including NPO status. Hibiclens wash for infection control. Patient aware to stop NSAID, OTC herbals  for 7-10 days, needs to be accompanied  by adult upon discharge.  Patient verbalized understanding  anesthesiologist to review pst labs, ekg, medical clearances and optimization for surgery   cardio clearance

## 2022-05-09 NOTE — H&P PST ADULT - NSICDXPASTSURGICALHX_GEN_ALL_CORE_FT
PAST SURGICAL HISTORY:  History of loop recorder     radical prostatectomy and lynmphadenectomy 2007    Right Inguinal Hernia Repair 2002     PAST SURGICAL HISTORY:  History of cardioversion     History of loop recorder     radical prostatectomy and lynmphadenectomy 2007    Right Inguinal Hernia Repair 2002

## 2022-05-09 NOTE — ASSESSMENT
[FreeTextEntry1] : Patient with hx Hypertension , PAF in sinus rhythm without active cardiac symptoms who is stable optimal for low risk surgery , patient is intermediate risk .\par \par PAF in sinus rhythm on multaq , BB ,  hold eliquis 3  days prior to procedure \par \par Coronary atherosclerosis  : normal chemical stress test , normal ventricular function \par \par Mild MR AI TR :  mild dilated aortic size  monitor annually\par \par HTN : well controlled.  Continue BB, ARB, follow low salt diet , continue home BP monitor recommended \par \par HLD  controlled continue current medication  LDL < 70  \par \par SLeep apnea : not using cpap , awaiting new machine \par \par Follow up with 4 months \par

## 2022-05-09 NOTE — H&P PST ADULT - HISTORY OF PRESENT ILLNESS
72 yo male , pmh- htn,hld,afib on eluiquis  had cardioversion in 2021- has loop recorder , hx of prostate cancer s/p prostatectomy . had been having hematuria - evaluated - bladder tumor scheduled for cystoscopy , TURB  denies recent travels in the past 30 days. No fever, SOB, cough, flu like symptoms or body rash- covid screen    covid vaccine completed

## 2022-05-09 NOTE — H&P PST ADULT - ATTENDING COMMENTS
Bladder tumor for TURB. Procedure, Alternatives, Benefits and Risks discussed, all questions answered. Patient understands and requests to proceed with the plan and procedure.

## 2022-05-10 LAB
CULTURE RESULTS: SIGNIFICANT CHANGE UP
SPECIMEN SOURCE: SIGNIFICANT CHANGE UP

## 2022-05-11 DIAGNOSIS — I10 ESSENTIAL (PRIMARY) HYPERTENSION: ICD-10-CM

## 2022-05-11 DIAGNOSIS — Z01.818 ENCOUNTER FOR OTHER PREPROCEDURAL EXAMINATION: ICD-10-CM

## 2022-05-11 DIAGNOSIS — Z98.890 OTHER SPECIFIED POSTPROCEDURAL STATES: Chronic | ICD-10-CM

## 2022-05-11 DIAGNOSIS — I48.91 UNSPECIFIED ATRIAL FIBRILLATION: ICD-10-CM

## 2022-05-11 DIAGNOSIS — E78.5 HYPERLIPIDEMIA, UNSPECIFIED: ICD-10-CM

## 2022-05-11 DIAGNOSIS — D49.4 NEOPLASM OF UNSPECIFIED BEHAVIOR OF BLADDER: ICD-10-CM

## 2022-05-12 ENCOUNTER — TRANSCRIPTION ENCOUNTER (OUTPATIENT)
Age: 72
End: 2022-05-12

## 2022-05-13 ENCOUNTER — APPOINTMENT (OUTPATIENT)
Dept: UROLOGY | Facility: HOSPITAL | Age: 72
End: 2022-05-13

## 2022-05-13 ENCOUNTER — RESULT REVIEW (OUTPATIENT)
Age: 72
End: 2022-05-13

## 2022-05-13 ENCOUNTER — TRANSCRIPTION ENCOUNTER (OUTPATIENT)
Age: 72
End: 2022-05-13

## 2022-05-13 ENCOUNTER — OUTPATIENT (OUTPATIENT)
Dept: OUTPATIENT SERVICES | Facility: HOSPITAL | Age: 72
LOS: 1 days | Discharge: HOME | End: 2022-05-13
Payer: MEDICARE

## 2022-05-13 VITALS
HEART RATE: 51 BPM | OXYGEN SATURATION: 99 % | WEIGHT: 183.65 LBS | SYSTOLIC BLOOD PRESSURE: 131 MMHG | RESPIRATION RATE: 18 BRPM | HEIGHT: 70 IN | DIASTOLIC BLOOD PRESSURE: 75 MMHG | TEMPERATURE: 98 F

## 2022-05-13 VITALS
RESPIRATION RATE: 16 BRPM | DIASTOLIC BLOOD PRESSURE: 68 MMHG | OXYGEN SATURATION: 98 % | HEART RATE: 58 BPM | SYSTOLIC BLOOD PRESSURE: 122 MMHG

## 2022-05-13 DIAGNOSIS — Z98.890 OTHER SPECIFIED POSTPROCEDURAL STATES: Chronic | ICD-10-CM

## 2022-05-13 PROCEDURE — 52240 CYSTOSCOPY AND TREATMENT: CPT

## 2022-05-13 PROCEDURE — 88112 CYTOPATH CELL ENHANCE TECH: CPT | Mod: 26

## 2022-05-13 RX ORDER — METOPROLOL TARTRATE 50 MG
1 TABLET ORAL
Qty: 0 | Refills: 0 | DISCHARGE

## 2022-05-13 RX ORDER — SODIUM CHLORIDE 9 MG/ML
1000 INJECTION, SOLUTION INTRAVENOUS
Refills: 0 | Status: DISCONTINUED | OUTPATIENT
Start: 2022-05-13 | End: 2022-05-13

## 2022-05-13 RX ORDER — ASPIRIN/CALCIUM CARB/MAGNESIUM 324 MG
1 TABLET ORAL
Qty: 0 | Refills: 0 | DISCHARGE

## 2022-05-13 RX ORDER — CEFUROXIME AXETIL 250 MG
1 TABLET ORAL
Qty: 10 | Refills: 0
Start: 2022-05-13 | End: 2022-05-17

## 2022-05-13 RX ORDER — FENTANYL CITRATE 50 UG/ML
50 INJECTION INTRAVENOUS ONCE
Refills: 0 | Status: DISCONTINUED | OUTPATIENT
Start: 2022-05-13 | End: 2022-05-13

## 2022-05-13 RX ORDER — APIXABAN 2.5 MG/1
1 TABLET, FILM COATED ORAL
Qty: 0 | Refills: 0 | DISCHARGE

## 2022-05-13 RX ORDER — VALSARTAN 80 MG/1
1 TABLET ORAL
Qty: 0 | Refills: 0 | DISCHARGE

## 2022-05-13 RX ORDER — FENTANYL CITRATE 50 UG/ML
25 INJECTION INTRAVENOUS
Refills: 0 | Status: DISCONTINUED | OUTPATIENT
Start: 2022-05-13 | End: 2022-05-13

## 2022-05-13 RX ORDER — AMLODIPINE BESYLATE 2.5 MG/1
1 TABLET ORAL
Qty: 0 | Refills: 0 | DISCHARGE

## 2022-05-13 RX ORDER — ONDANSETRON 8 MG/1
4 TABLET, FILM COATED ORAL ONCE
Refills: 0 | Status: DISCONTINUED | OUTPATIENT
Start: 2022-05-13 | End: 2022-05-13

## 2022-05-13 RX ORDER — SIMVASTATIN 20 MG/1
1 TABLET, FILM COATED ORAL
Qty: 0 | Refills: 0 | DISCHARGE

## 2022-05-13 RX ORDER — DRONEDARONE 400 MG/1
1 TABLET, FILM COATED ORAL
Qty: 0 | Refills: 0 | DISCHARGE

## 2022-05-13 RX ORDER — DOXAZOSIN MESYLATE 4 MG
1 TABLET ORAL
Qty: 0 | Refills: 0 | DISCHARGE

## 2022-05-13 RX ORDER — METOPROLOL TARTRATE 50 MG
12.5 TABLET ORAL
Qty: 0 | Refills: 0 | DISCHARGE

## 2022-05-13 RX ORDER — SODIUM CHLORIDE 9 MG/ML
3 INJECTION INTRAMUSCULAR; INTRAVENOUS; SUBCUTANEOUS EVERY 8 HOURS
Refills: 0 | Status: DISCONTINUED | OUTPATIENT
Start: 2022-05-13 | End: 2022-05-13

## 2022-05-13 RX ADMIN — SODIUM CHLORIDE 100 MILLILITER(S): 9 INJECTION, SOLUTION INTRAVENOUS at 13:03

## 2022-05-13 NOTE — ASU DISCHARGE PLAN (ADULT/PEDIATRIC) - PROVIDER TOKENS
FREE:[LAST:[April],FIRST:[Taj],PHONE:[phone]],FAX:[(   )    -],ADDRESS:[[address] [place] [address]],SCHEDULEDAPPT:[05/16/2022],ESTABLISHEDPATIENT:[T]]

## 2022-05-13 NOTE — ASU PATIENT PROFILE, ADULT - NSICDXPASTSURGICALHX_GEN_ALL_CORE_FT
PAST SURGICAL HISTORY:  History of cardioversion     History of loop recorder     radical prostatectomy and lynmphadenectomy 2007    Right Inguinal Hernia Repair 2002

## 2022-05-13 NOTE — ASU DISCHARGE PLAN (ADULT/PEDIATRIC) - CARE PROVIDER_API CALL
Taj Chen  [address]  Phone: [phone]  Fax: (   )    -  Established Patient  Scheduled Appointment: 05/16/2022

## 2022-05-13 NOTE — ASU PATIENT PROFILE, ADULT - NSICDXPASTMEDICALHX_GEN_ALL_CORE_FT
PAST MEDICAL HISTORY:  Afib     Carpal Tunnel Syndrome     Cervical Radiculopathy     Generalized Osteoarthritis     HLD (hyperlipidemia)     Hypertension     Incomplete Right Bundle Branch Block     Inguinal Hernia with Gangrene, Recurrent Bilateral     Prostate Cancer     Right Inguinal Hernia     Sleep apnea do not use CPAP

## 2022-05-13 NOTE — ASU DISCHARGE PLAN (ADULT/PEDIATRIC) - NS MD DC FALL RISK RISK
For information on Fall & Injury Prevention, visit: [email or web address] OR  [email or web address] OR  https://www.cdc.gov/steadi/patient.html

## 2022-05-14 LAB
CULTURE RESULTS: NO GROWTH — SIGNIFICANT CHANGE UP
SPECIMEN SOURCE: SIGNIFICANT CHANGE UP

## 2022-05-16 ENCOUNTER — NON-APPOINTMENT (OUTPATIENT)
Age: 72
End: 2022-05-16

## 2022-05-16 PROBLEM — E78.5 HYPERLIPIDEMIA, UNSPECIFIED: Chronic | Status: ACTIVE | Noted: 2022-05-11

## 2022-05-16 PROBLEM — I48.91 UNSPECIFIED ATRIAL FIBRILLATION: Chronic | Status: ACTIVE | Noted: 2022-05-09

## 2022-05-16 PROBLEM — G47.30 SLEEP APNEA, UNSPECIFIED: Chronic | Status: ACTIVE | Noted: 2022-05-13

## 2022-05-16 LAB — NON-GYNECOLOGICAL CYTOLOGY STUDY: SIGNIFICANT CHANGE UP

## 2022-05-17 ENCOUNTER — APPOINTMENT (OUTPATIENT)
Dept: ELECTROPHYSIOLOGY | Facility: CLINIC | Age: 72
End: 2022-05-17
Payer: MEDICARE

## 2022-05-17 PROCEDURE — G2066: CPT

## 2022-05-17 PROCEDURE — 93298 REM INTERROG DEV EVAL SCRMS: CPT

## 2022-05-23 DIAGNOSIS — Q54.8 OTHER HYPOSPADIAS: ICD-10-CM

## 2022-05-23 DIAGNOSIS — Z79.01 LONG TERM (CURRENT) USE OF ANTICOAGULANTS: ICD-10-CM

## 2022-05-23 DIAGNOSIS — R55 SYNCOPE AND COLLAPSE: ICD-10-CM

## 2022-05-23 DIAGNOSIS — I25.10 ATHEROSCLEROTIC HEART DISEASE OF NATIVE CORONARY ARTERY WITHOUT ANGINA PECTORIS: ICD-10-CM

## 2022-05-23 DIAGNOSIS — Z79.82 LONG TERM (CURRENT) USE OF ASPIRIN: ICD-10-CM

## 2022-05-23 DIAGNOSIS — D49.4 NEOPLASM OF UNSPECIFIED BEHAVIOR OF BLADDER: ICD-10-CM

## 2022-05-23 DIAGNOSIS — G47.33 OBSTRUCTIVE SLEEP APNEA (ADULT) (PEDIATRIC): ICD-10-CM

## 2022-05-23 DIAGNOSIS — I48.0 PAROXYSMAL ATRIAL FIBRILLATION: ICD-10-CM

## 2022-05-23 DIAGNOSIS — N35.811 OTHER URETHRAL STRICTURE, MALE, MEATAL: ICD-10-CM

## 2022-05-23 DIAGNOSIS — E78.5 HYPERLIPIDEMIA, UNSPECIFIED: ICD-10-CM

## 2022-05-23 DIAGNOSIS — I10 ESSENTIAL (PRIMARY) HYPERTENSION: ICD-10-CM

## 2022-05-23 DIAGNOSIS — Z85.46 PERSONAL HISTORY OF MALIGNANT NEOPLASM OF PROSTATE: ICD-10-CM

## 2022-05-23 LAB — SURGICAL PATHOLOGY STUDY: SIGNIFICANT CHANGE UP

## 2022-05-24 ENCOUNTER — LABORATORY RESULT (OUTPATIENT)
Age: 72
End: 2022-05-24

## 2022-05-24 ENCOUNTER — APPOINTMENT (OUTPATIENT)
Dept: UROLOGY | Facility: CLINIC | Age: 72
End: 2022-05-24
Payer: MEDICARE

## 2022-05-24 VITALS
TEMPERATURE: 98.7 F | DIASTOLIC BLOOD PRESSURE: 66 MMHG | WEIGHT: 183 LBS | SYSTOLIC BLOOD PRESSURE: 122 MMHG | BODY MASS INDEX: 26.2 KG/M2 | OXYGEN SATURATION: 98 % | HEART RATE: 56 BPM | HEIGHT: 70 IN

## 2022-05-24 PROCEDURE — 99214 OFFICE O/P EST MOD 30 MIN: CPT

## 2022-05-24 NOTE — HISTORY OF PRESENT ILLNESS
[FreeTextEntry1] : 3/23/2022: Charles Bardales is a 71 year old male that has terminal gross painless intermittent hematuria for the last one year. Hx of radical prostatomy in 2008 for Ca prostate [place] C+Score 6. . 6 months ago A-Fib and started on Eliquis. 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 cystic mass adjacent to the left lateral wall of the bladder; possible lymphocele. Voiding well; has coronal Hypospadius with materies stenosis. Urine cytology negative for high grade malignancy. Will schedule for meatal dilation, cystoscopy and possible biopsy and fulguration in 2 months. Patient is visiting [facility]. Will stop Eliquis 3 days prior to procedure.\par \par 05/24/22: S/P CA Bladder. High grade tumor. No muscle in the specimen. Will schedule for cysto and biopsy in 6 weeks. \par RTC; 6 weeks for cysto, biopsy and fulguration.

## 2022-05-24 NOTE — REVIEW OF SYSTEMS
[Seen by urologist before (Name)  ___] : Preciously seen by a urologist: [unfilled] [Blood in urine that you can see] : blood visible in urine [Palpitations] : palpitations [Negative] : Heme/Lymph

## 2022-05-24 NOTE — PHYSICAL EXAM
[General Appearance - Well Developed] : well developed [General Appearance - Well Nourished] : well nourished [Normal Appearance] : normal appearance [Well Groomed] : well groomed [General Appearance - In No Acute Distress] : no acute distress [Abdomen Soft] : soft [Abdomen Tenderness] : non-tender [Costovertebral Angle Tenderness] : no ~M costovertebral angle tenderness [Penis Abnormality] : normal circumcised penis [Urinary Bladder Findings] : the bladder was normal on palpation [Scrotum] : the scrotum was normal [Epididymis] : the epididymides were normal [Testes Tenderness] : no tenderness of the testes [Testes Mass (___cm)] : there were no testicular masses [Edema] : no peripheral edema [] : no respiratory distress [Respiration, Rhythm And Depth] : normal respiratory rhythm and effort [Exaggerated Use Of Accessory Muscles For Inspiration] : no accessory muscle use [Oriented To Time, Place, And Person] : oriented to person, place, and time [Affect] : the affect was normal [Mood] : the mood was normal [Not Anxious] : not anxious [Normal Station and Gait] : the gait and station were normal for the patient's age [No Focal Deficits] : no focal deficits [No Palpable Adenopathy] : no palpable adenopathy [FreeTextEntry1] : coronal Hypospadius, meatal stenosis

## 2022-05-25 LAB
APPEARANCE: ABNORMAL
BILIRUBIN URINE: NEGATIVE
BLOOD URINE: ABNORMAL
COLOR: YELLOW
GLUCOSE QUALITATIVE U: NEGATIVE
KETONES URINE: NEGATIVE
LEUKOCYTE ESTERASE URINE: ABNORMAL
NITRITE URINE: NEGATIVE
PH URINE: 5.5
PROTEIN URINE: ABNORMAL
SPECIFIC GRAVITY URINE: 1.03
UROBILINOGEN URINE: NORMAL

## 2022-05-26 LAB — BACTERIA UR CULT: NORMAL

## 2022-06-21 ENCOUNTER — NON-APPOINTMENT (OUTPATIENT)
Age: 72
End: 2022-06-21

## 2022-06-21 ENCOUNTER — APPOINTMENT (OUTPATIENT)
Dept: ELECTROPHYSIOLOGY | Facility: CLINIC | Age: 72
End: 2022-06-21
Payer: MEDICARE

## 2022-06-21 PROCEDURE — 93298 REM INTERROG DEV EVAL SCRMS: CPT

## 2022-06-21 PROCEDURE — G2066: CPT

## 2022-07-13 ENCOUNTER — APPOINTMENT (OUTPATIENT)
Dept: UROLOGY | Facility: CLINIC | Age: 72
End: 2022-07-13

## 2022-07-13 VITALS
HEART RATE: 64 BPM | DIASTOLIC BLOOD PRESSURE: 66 MMHG | OXYGEN SATURATION: 98 % | SYSTOLIC BLOOD PRESSURE: 119 MMHG | TEMPERATURE: 98.5 F

## 2022-07-13 PROCEDURE — 52224Z: CUSTOM

## 2022-07-13 PROCEDURE — 99213 OFFICE O/P EST LOW 20 MIN: CPT | Mod: 25

## 2022-07-13 RX ORDER — CEFUROXIME AXETIL 500 MG/1
500 TABLET ORAL
Qty: 10 | Refills: 0 | Status: DISCONTINUED | COMMUNITY
Start: 2022-05-13

## 2022-07-13 RX ORDER — APIXABAN 2.5 MG/1
2.5 TABLET, FILM COATED ORAL
Qty: 180 | Refills: 0 | Status: DISCONTINUED | COMMUNITY
Start: 2022-04-08

## 2022-07-13 NOTE — REVIEW OF SYSTEMS
[Palpitations] : palpitations [Seen by urologist before (Name)  ___] : Preciously seen by a urologist: [unfilled] [Blood in urine that you can see] : blood visible in urine [Negative] : Heme/Lymph [see HPI] : see HPI

## 2022-07-13 NOTE — HISTORY OF PRESENT ILLNESS
[FreeTextEntry1] : 3/23/2022: Charles Redmond is a 71 year old male that has terminal gross painless intermittent hematuria for the last one year. Hx of radical prostatomy in 2008 for Ca prostate [place] C+Score 6. . 6 months ago A-Fib and started on Eliquis. 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 cystic mass adjacent to the left lateral wall of the bladder; possible lymphocele. Voiding well; has coronal Hypospadius with materies stenosis. Urine cytology negative for high grade malignancy. Will schedule for meatal dilation, cystoscopy and possible biopsy and fulguration in 2 months. Patient is visiting [facility]. Will stop Eliquis 3 days prior to procedure.\par \par 05/24/22: S/P CA Bladder. High grade tumor. No muscle in the specimen. Will schedule for cysto and biopsy in 6 weeks. \par RTC; 6 weeks for cysto, biopsy and fulguration.\par \par \par 07/13/2022: Mr. REDMOND is a 71 year male who presents today for a follow up for Ca Bladder. .Cystoscopy with shows lesion at the site of resection ? scar or tumor, biopsied and fulgurated. Urine for cytology sent.\par RTC: 2 weeks for to check Path, UA and culture

## 2022-07-15 LAB
CORE LAB BIOPSY: NORMAL
URINE CYTOLOGY: NORMAL

## 2022-07-26 ENCOUNTER — APPOINTMENT (OUTPATIENT)
Dept: ELECTROPHYSIOLOGY | Facility: CLINIC | Age: 72
End: 2022-07-26

## 2022-07-26 ENCOUNTER — NON-APPOINTMENT (OUTPATIENT)
Age: 72
End: 2022-07-26

## 2022-07-26 PROCEDURE — 93298 REM INTERROG DEV EVAL SCRMS: CPT

## 2022-07-26 PROCEDURE — G2066: CPT

## 2022-07-27 ENCOUNTER — APPOINTMENT (OUTPATIENT)
Dept: UROLOGY | Facility: CLINIC | Age: 72
End: 2022-07-27

## 2022-07-27 VITALS
HEIGHT: 70 IN | WEIGHT: 179 LBS | TEMPERATURE: 98.5 F | DIASTOLIC BLOOD PRESSURE: 66 MMHG | OXYGEN SATURATION: 99 % | HEART RATE: 61 BPM | SYSTOLIC BLOOD PRESSURE: 130 MMHG | BODY MASS INDEX: 25.62 KG/M2

## 2022-07-27 LAB
APPEARANCE: CLEAR
BILIRUBIN URINE: NEGATIVE
BLOOD URINE: NEGATIVE
COLOR: YELLOW
GLUCOSE QUALITATIVE U: NEGATIVE
KETONES URINE: NEGATIVE
LEUKOCYTE ESTERASE URINE: NEGATIVE
NITRITE URINE: NEGATIVE
PH URINE: 6
PROTEIN URINE: NORMAL
SPECIFIC GRAVITY URINE: 1.03
UROBILINOGEN URINE: NORMAL

## 2022-07-27 PROCEDURE — 99213 OFFICE O/P EST LOW 20 MIN: CPT

## 2022-07-27 RX ORDER — ASPIRIN 81 MG
81 TABLET, DELAYED RELEASE (ENTERIC COATED) ORAL DAILY
Refills: 2 | Status: DISCONTINUED | COMMUNITY
End: 2022-07-27

## 2022-07-27 NOTE — REVIEW OF SYSTEMS
[Palpitations] : palpitations [see HPI] : see HPI [Seen by urologist before (Name)  ___] : Preciously seen by a urologist: [unfilled] [Blood in urine that you can see] : blood visible in urine [Negative] : Heme/Lymph

## 2022-07-27 NOTE — HISTORY OF PRESENT ILLNESS
[FreeTextEntry1] : 3/23/2022: Charles Redmond is a 71 year old male that has terminal gross painless intermittent hematuria for the last one year. Hx of radical prostatomy in 2008 for Ca prostate [place] C+Score 6. . 6 months ago A-Fib and started on Eliquis. 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 cystic mass adjacent to the left lateral wall of the bladder; possible lymphocele. Voiding well; has coronal Hypospadius with materies stenosis. Urine cytology negative for high grade malignancy. Will schedule for meatal dilation, cystoscopy and possible biopsy and fulguration in 2 months. Patient is visiting [facility]. Will stop Eliquis 3 days prior to procedure.\par \par 05/24/22: S/P CA Bladder. High grade tumor. No muscle in the specimen. Will schedule for cysto and biopsy in 6 weeks. \par RTC; 6 weeks for cysto, biopsy and fulguration.\par \par \par 07/13/2022: Mr. REDMOND is a 71 year male who presents today for a follow up for Ca Bladder. .Cystoscopy with shows lesion at the site of resection ? scar or tumor, biopsied and fulgurated. Urine for cytology sent.\par RTC: 2 weeks for to check Path, UA and culture\par \par \par 07/27/2022: Mr. REDMOND is a 71 year male who presents today for a follow up for Bladder polyp. Had cystoscopy done last visit on 07/13/22 which showed lesions at the site of resection and was biopsied and fulgurated.  Will discuss pathology results today. Pathology results: showing atypical cells. Will do follow up Cystoscopy in 2 months. \par \par  71 year old male that has terminal gross painless intermittent hematuria for the last one year. 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 . 6 months ago A-Fib and started on Eliquis. 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 cystic mass adjacent to the left lateral wall of the bladder; possible lymphocele. Voiding well; has coronal Hypospadius with materies stenosis. Urine cytology negative for high grade malignancy. 05/13/22:  CA Bladder. High grade tumor, invasive papillary ca, completely resected. Follow up Cystoscopy atypia on the specimen. Will repeat Cysto in 2 months. \par \par \par RTC: 2 months for Cystoscopy with possible biopsy and fulguration

## 2022-07-27 NOTE — PHYSICAL EXAM

## 2022-07-28 LAB — BACTERIA UR CULT: NORMAL

## 2022-08-03 ENCOUNTER — APPOINTMENT (OUTPATIENT)
Dept: ELECTROPHYSIOLOGY | Facility: CLINIC | Age: 72
End: 2022-08-03

## 2022-08-03 VITALS
WEIGHT: 180 LBS | SYSTOLIC BLOOD PRESSURE: 111 MMHG | HEART RATE: 61 BPM | HEIGHT: 70 IN | BODY MASS INDEX: 25.77 KG/M2 | DIASTOLIC BLOOD PRESSURE: 60 MMHG | OXYGEN SATURATION: 97 %

## 2022-08-03 PROCEDURE — 93285 PRGRMG DEV EVAL SCRMS IP: CPT

## 2022-09-07 ENCOUNTER — NON-APPOINTMENT (OUTPATIENT)
Age: 72
End: 2022-09-07

## 2022-09-07 ENCOUNTER — APPOINTMENT (OUTPATIENT)
Dept: ELECTROPHYSIOLOGY | Facility: CLINIC | Age: 72
End: 2022-09-07

## 2022-09-07 PROCEDURE — G2066: CPT

## 2022-09-07 PROCEDURE — 93298 REM INTERROG DEV EVAL SCRMS: CPT

## 2022-10-11 ENCOUNTER — APPOINTMENT (OUTPATIENT)
Dept: CARDIOLOGY | Facility: CLINIC | Age: 72
End: 2022-10-11

## 2022-10-11 ENCOUNTER — APPOINTMENT (OUTPATIENT)
Dept: ELECTROPHYSIOLOGY | Facility: CLINIC | Age: 72
End: 2022-10-11

## 2022-10-12 ENCOUNTER — NON-APPOINTMENT (OUTPATIENT)
Age: 72
End: 2022-10-12

## 2022-10-12 PROCEDURE — G2066: CPT

## 2022-10-12 PROCEDURE — 93298 REM INTERROG DEV EVAL SCRMS: CPT

## 2022-10-19 ENCOUNTER — APPOINTMENT (OUTPATIENT)
Dept: UROLOGY | Facility: CLINIC | Age: 72
End: 2022-10-19

## 2022-10-19 VITALS
HEART RATE: 45 BPM | TEMPERATURE: 98.4 F | WEIGHT: 180 LBS | DIASTOLIC BLOOD PRESSURE: 71 MMHG | SYSTOLIC BLOOD PRESSURE: 136 MMHG | BODY MASS INDEX: 25.83 KG/M2 | OXYGEN SATURATION: 99 %

## 2022-10-19 PROCEDURE — 52000 CYSTOURETHROSCOPY: CPT

## 2022-10-19 PROCEDURE — 99213 OFFICE O/P EST LOW 20 MIN: CPT | Mod: 25

## 2022-10-19 NOTE — HISTORY OF PRESENT ILLNESS
[FreeTextEntry1] : 3/23/2022: Charles Redmond is a 71 year old male that has terminal gross painless intermittent hematuria for the last one year. Hx of radical prostatomy in 2008 for Ca prostate [place] C+Score 6. . 6 months ago A-Fib and started on Eliquis. 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 cystic mass adjacent to the left lateral wall of the bladder; possible lymphocele. Voiding well; has coronal Hypospadius with materies stenosis. Urine cytology negative for high grade malignancy. Will schedule for meatal dilation, cystoscopy and possible biopsy and fulguration in 2 months. Patient is visiting [facility]. Will stop Eliquis 3 days prior to procedure.\par \par 05/24/22: S/P CA Bladder. High grade tumor. No muscle in the specimen. Will schedule for cysto and biopsy in 6 weeks. \par RTC; 6 weeks for cysto, biopsy and fulguration.\par \par \par 07/13/2022: Mr. REDMOND is a 71 year male who presents today for a follow up for Ca Bladder. .Cystoscopy with shows lesion at the site of resection ? scar or tumor, biopsied and fulgurated. Urine for cytology sent.\par RTC: 2 weeks for to check Path, UA and culture\par \par \par 07/27/2022: Mr. REDMOND is a 71 year male who presents today for a follow up for Bladder polyp. Had cystoscopy done last visit on 07/13/22 which showed lesions at the site of resection and was biopsied and fulgurated.  Will discuss pathology results today. Pathology results: showing atypical cells. Will do follow up Cystoscopy in 2 months. \par \par  71 year old male that has terminal gross painless intermittent hematuria for the last one year. 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 . 6 months ago A-Fib and started on Eliquis. 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 cystic mass adjacent to the left lateral wall of the bladder; possible lymphocele. Voiding well; has coronal Hypospadius with materies stenosis. Urine cytology negative for high grade malignancy. 05/13/22:  CA Bladder. High grade tumor, invasive papillary ca, completely resected. Follow up Cystoscopy atypia on the specimen. Will repeat Cysto in 2 months. \par \par \par \par 10/19/2022: Mr. REDMOND is a 72 year male who presents today for a follow up for Bladder polyp. Has terminal gross painless intermittent hematuria for the last one year. 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 . 6 months ago A-Fib and started on Eliquis. 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 cystic mass adjacent to the left lateral wall of the bladder; possible lymphocele. Voiding well; has coronal Hypospadius with meatal  stenosis. Urine cytology negative for high grade malignancy. 05/13/22:  CA Bladder. High grade tumor, T1 invasive papillary ca, completely resected. Follow up Cystoscopy atypia on the specimen. Repeating Cysto today. No recurrence. urine sent for cytology.\par \par RTC: 3 months for Cystoscopy with possible biopsy and fulguration\par

## 2022-10-19 NOTE — PHYSICAL EXAM
[General Appearance - Well Nourished] : well nourished [General Appearance - Well Developed] : well developed [Normal Appearance] : normal appearance [Well Groomed] : well groomed [General Appearance - In No Acute Distress] : no acute distress [Abdomen Soft] : soft [Abdomen Tenderness] : non-tender [Costovertebral Angle Tenderness] : no ~M costovertebral angle tenderness [Urinary Bladder Findings] : the bladder was normal on palpation [Edema] : no peripheral edema [] : no respiratory distress [Respiration, Rhythm And Depth] : normal respiratory rhythm and effort [Exaggerated Use Of Accessory Muscles For Inspiration] : no accessory muscle use [Oriented To Time, Place, And Person] : oriented to person, place, and time [Affect] : the affect was normal [Mood] : the mood was normal [Not Anxious] : not anxious [Normal Station and Gait] : the gait and station were normal for the patient's age [No Focal Deficits] : no focal deficits [No Palpable Adenopathy] : no palpable adenopathy [Scrotum] : the scrotum was normal [Epididymis] : the epididymides were normal [Testes Tenderness] : no tenderness of the testes [FreeTextEntry1] : coronal Hypospadius, meatal stenosis [Testes Mass (___cm)] : there were no testicular masses

## 2022-10-21 ENCOUNTER — APPOINTMENT (OUTPATIENT)
Dept: CARDIOLOGY | Facility: CLINIC | Age: 72
End: 2022-10-21

## 2022-10-21 ENCOUNTER — NON-APPOINTMENT (OUTPATIENT)
Age: 72
End: 2022-10-21

## 2022-10-21 VITALS
HEIGHT: 70 IN | OXYGEN SATURATION: 99 % | BODY MASS INDEX: 25.48 KG/M2 | HEART RATE: 54 BPM | DIASTOLIC BLOOD PRESSURE: 60 MMHG | WEIGHT: 178 LBS | SYSTOLIC BLOOD PRESSURE: 120 MMHG

## 2022-10-21 VITALS
HEIGHT: 70 IN | DIASTOLIC BLOOD PRESSURE: 60 MMHG | HEART RATE: 54 BPM | BODY MASS INDEX: 25.48 KG/M2 | TEMPERATURE: 98 F | WEIGHT: 178 LBS | SYSTOLIC BLOOD PRESSURE: 120 MMHG

## 2022-10-21 LAB — URINE CYTOLOGY: NORMAL

## 2022-10-21 PROCEDURE — 93000 ELECTROCARDIOGRAM COMPLETE: CPT

## 2022-10-21 PROCEDURE — 99214 OFFICE O/P EST MOD 30 MIN: CPT | Mod: 25

## 2022-10-21 RX ORDER — METOPROLOL SUCCINATE 25 MG/1
25 TABLET, EXTENDED RELEASE ORAL
Qty: 90 | Refills: 0 | Status: ACTIVE | COMMUNITY
Start: 2021-11-24

## 2022-10-21 RX ORDER — AMLODIPINE BESYLATE 10 MG/1
10 TABLET ORAL DAILY
Qty: 1 | Refills: 1 | Status: ACTIVE | COMMUNITY

## 2022-10-21 NOTE — ASSESSMENT
[FreeTextEntry1] : Patient with hx Hypertension , PAF in sinus rhythm without active cardiac symptoms \par \par \par PAF in sinus rhythm on multaq , BB ,  continue medication , eliquis 5 mg po BID \par \par Coronary atherosclerosis  : normal chemical stress test , normal ventricular function \par \par Mild MR AI TR :  mild dilated aortic size  monitor annually\par \par HTN : well controlled.  Continue BB, ARB, follow low salt diet , continue home BP monitor recommended \par \par HLD  controlled continue current medication  LDL < 70  \par \par SLeep apnea : not using cpap , awaiting new machine \par \par Follow up with 4 months \par

## 2022-10-21 NOTE — HISTORY OF PRESENT ILLNESS
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 atherosclerosis  mild dilated aortic root , coronary calcification on CT chest   syncope  negatvie EPS at [facility],  [facility] 6 months ago  s/p ILR implantation 7/2021 came for follow up ,  \par \par Patient is having gross hematuria , due to bladder carcinoma  resolved, increase eliquis to 5 mg po nID \par \par Patient remain in sinus rhythm on Multaq , his blood pressure is controlled current dose of medication \par \par Patient denies any chest pain or shortness of breath or palpitation , patient is physically active , \par \par Patient has hx of sleep apnea  his cpap is broken ,     his blood pressure is controlled  patient had normal lipid profile \par \par ekg showed in sinus bradycardia , reviewed

## 2022-10-21 NOTE — CARDIOLOGY SUMMARY
[de-identified] : 5/9/22 sinus bradycardia 54 BPM normal QT  ( out side ekg reviewed ) [de-identified] : 12/1/20 normal chemical nuclear stress test [de-identified] : 12/18/20  EF 55-60% mild dilated aortic root 3.9 cm ascending aorta 3.8 cm  mild MR

## 2022-11-03 LAB
APPEARANCE: CLEAR
BILIRUBIN URINE: NEGATIVE
BLOOD URINE: NEGATIVE
COLOR: YELLOW
GLUCOSE QUALITATIVE U: NEGATIVE
KETONES URINE: NEGATIVE
LEUKOCYTE ESTERASE URINE: NEGATIVE
NITRITE URINE: NEGATIVE
PH URINE: 5.5
PROTEIN URINE: NORMAL
SPECIFIC GRAVITY URINE: 1.02
UROBILINOGEN URINE: NORMAL

## 2022-11-16 ENCOUNTER — APPOINTMENT (OUTPATIENT)
Dept: ELECTROPHYSIOLOGY | Facility: CLINIC | Age: 72
End: 2022-11-16

## 2022-11-16 ENCOUNTER — NON-APPOINTMENT (OUTPATIENT)
Age: 72
End: 2022-11-16

## 2022-11-16 PROCEDURE — 93298 REM INTERROG DEV EVAL SCRMS: CPT

## 2022-11-16 PROCEDURE — G2066: CPT

## 2022-12-12 ENCOUNTER — NON-APPOINTMENT (OUTPATIENT)
Age: 72
End: 2022-12-12

## 2022-12-21 ENCOUNTER — NON-APPOINTMENT (OUTPATIENT)
Age: 72
End: 2022-12-21

## 2022-12-21 ENCOUNTER — APPOINTMENT (OUTPATIENT)
Dept: ELECTROPHYSIOLOGY | Facility: CLINIC | Age: 72
End: 2022-12-21

## 2022-12-21 PROCEDURE — G2066: CPT

## 2022-12-21 PROCEDURE — 93298 REM INTERROG DEV EVAL SCRMS: CPT

## 2023-02-01 ENCOUNTER — NON-APPOINTMENT (OUTPATIENT)
Age: 73
End: 2023-02-01

## 2023-02-01 ENCOUNTER — APPOINTMENT (OUTPATIENT)
Dept: ELECTROPHYSIOLOGY | Facility: CLINIC | Age: 73
End: 2023-02-01
Payer: MEDICARE

## 2023-02-01 VITALS
HEART RATE: 55 BPM | WEIGHT: 178 LBS | HEIGHT: 70 IN | BODY MASS INDEX: 25.48 KG/M2 | DIASTOLIC BLOOD PRESSURE: 63 MMHG | OXYGEN SATURATION: 100 % | SYSTOLIC BLOOD PRESSURE: 121 MMHG

## 2023-02-01 PROCEDURE — 93285 PRGRMG DEV EVAL SCRMS IP: CPT

## 2023-02-24 ENCOUNTER — APPOINTMENT (OUTPATIENT)
Dept: CARDIOLOGY | Facility: CLINIC | Age: 73
End: 2023-02-24
Payer: MEDICARE

## 2023-02-24 ENCOUNTER — NON-APPOINTMENT (OUTPATIENT)
Age: 73
End: 2023-02-24

## 2023-02-24 VITALS
OXYGEN SATURATION: 100 % | BODY MASS INDEX: 26.05 KG/M2 | TEMPERATURE: 97.6 F | DIASTOLIC BLOOD PRESSURE: 58 MMHG | HEIGHT: 70 IN | WEIGHT: 182 LBS | HEART RATE: 60 BPM | SYSTOLIC BLOOD PRESSURE: 106 MMHG

## 2023-02-24 VITALS
HEART RATE: 60 BPM | OXYGEN SATURATION: 100 % | SYSTOLIC BLOOD PRESSURE: 110 MMHG | HEIGHT: 70 IN | RESPIRATION RATE: 14 BRPM | BODY MASS INDEX: 26.05 KG/M2 | WEIGHT: 182 LBS | DIASTOLIC BLOOD PRESSURE: 60 MMHG

## 2023-02-24 PROCEDURE — 99214 OFFICE O/P EST MOD 30 MIN: CPT

## 2023-02-24 PROCEDURE — 93000 ELECTROCARDIOGRAM COMPLETE: CPT

## 2023-02-24 NOTE — CARDIOLOGY SUMMARY
[de-identified] : 2/24/23 sinus bradycardia 54 BPM normal QT  ( out side ekg reviewed ) [de-identified] : 12/1/20 normal chemical nuclear stress test [de-identified] : 12/18/20  EF 55-60% mild dilated aortic root 3.9 cm ascending aorta 3.8 cm  mild MR

## 2023-02-24 NOTE — HISTORY OF PRESENT ILLNESS
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 atherosclerosis  mild dilated aortic root , coronary calcification on CT chest   syncope  negatvie EPS at [facility],  [facility] 6 months ago  s/p ILR implantation 7/2021 came for follow up ,  says he is feeling fine , no recurrence of palpitation \par \par Patient is having gross hematuria , due to bladder carcinoma  resolved,  taking eliquis to 5 mg po  BID \par \par Patient remain in sinus rhythm on Multaq , his blood pressure is controlled current dose of medication  , blood work showed  controlled lipids  normal  TSh \par \par Patient denies any chest pain or shortness of breath or palpitation , patient is physically active , \par \par Patient has hx of sleep apnea  his cpap is broken ,     his blood pressure is controlled  patient had normal lipid profile \par \par ekg showed in sinus bradycardia , reviewed

## 2023-02-24 NOTE — ASSESSMENT
[FreeTextEntry1] : Patient with hx Hypertension , PAF in sinus rhythm without active cardiac symptoms  in sinus rhythm on multaq , BB ,  continue medication , eliquis 5 mg po BID \par \par Coronary atherosclerosis  : normal chemical stress test , normal ventricular function \par \par Mild MR AI TR :  mild dilated aortic size  monitor annually  , will obtain echocardiogram \par \par HTN : well controlled.  Continue BB, ARB, follow low salt diet , continue home BP monitor recommended \par \par HLD  controlled continue current medication  LDL < 70  \par \par SLeep apnea : not using cpap , awaiting new machine \par \par Follow up with 4 months \par

## 2023-03-01 ENCOUNTER — APPOINTMENT (OUTPATIENT)
Dept: UROLOGY | Facility: CLINIC | Age: 73
End: 2023-03-01

## 2023-03-07 ENCOUNTER — APPOINTMENT (OUTPATIENT)
Dept: ELECTROPHYSIOLOGY | Facility: CLINIC | Age: 73
End: 2023-03-07
Payer: MEDICARE

## 2023-03-08 ENCOUNTER — NON-APPOINTMENT (OUTPATIENT)
Age: 73
End: 2023-03-08

## 2023-03-08 PROCEDURE — G2066: CPT

## 2023-03-08 PROCEDURE — 93298 REM INTERROG DEV EVAL SCRMS: CPT

## 2023-03-17 ENCOUNTER — APPOINTMENT (OUTPATIENT)
Dept: UROLOGY | Facility: CLINIC | Age: 73
End: 2023-03-17
Payer: MEDICARE

## 2023-03-17 VITALS
SYSTOLIC BLOOD PRESSURE: 145 MMHG | TEMPERATURE: 97.8 F | HEART RATE: 60 BPM | DIASTOLIC BLOOD PRESSURE: 73 MMHG | OXYGEN SATURATION: 99 %

## 2023-03-17 PROCEDURE — 52000 CYSTOURETHROSCOPY: CPT

## 2023-03-17 RX ORDER — SULFAMETHOXAZOLE AND TRIMETHOPRIM 800; 160 MG/1; MG/1
800-160 TABLET ORAL
Refills: 0 | Status: COMPLETED | OUTPATIENT
Start: 2023-03-17

## 2023-03-17 RX ADMIN — SULFAMETHOXAZOLE AND TRIMETHOPRIM 0 MG: 800; 160 TABLET ORAL at 00:00

## 2023-04-12 ENCOUNTER — APPOINTMENT (OUTPATIENT)
Dept: ELECTROPHYSIOLOGY | Facility: CLINIC | Age: 73
End: 2023-04-12
Payer: MEDICARE

## 2023-04-12 ENCOUNTER — NON-APPOINTMENT (OUTPATIENT)
Age: 73
End: 2023-04-12

## 2023-04-12 PROCEDURE — 93298 REM INTERROG DEV EVAL SCRMS: CPT

## 2023-04-12 PROCEDURE — G2066: CPT

## 2023-04-25 NOTE — H&P PST ADULT - PROBLEM SELECTOR PLAN 1
Third and final attempt at hospital follow up. First two attempts made by clinical transitions RN. VM box full and unable to leave message.  Michael Michele RN scheduled for cystoscopy , TURB

## 2023-05-17 ENCOUNTER — NON-APPOINTMENT (OUTPATIENT)
Age: 73
End: 2023-05-17

## 2023-05-17 ENCOUNTER — APPOINTMENT (OUTPATIENT)
Dept: ELECTROPHYSIOLOGY | Facility: CLINIC | Age: 73
End: 2023-05-17
Payer: MEDICARE

## 2023-05-17 PROCEDURE — 93298 REM INTERROG DEV EVAL SCRMS: CPT

## 2023-05-17 PROCEDURE — G2066: CPT

## 2023-06-19 ENCOUNTER — APPOINTMENT (OUTPATIENT)
Dept: PULMONOLOGY | Facility: CLINIC | Age: 73
End: 2023-06-19
Payer: MEDICARE

## 2023-06-19 VITALS — DIASTOLIC BLOOD PRESSURE: 58 MMHG | OXYGEN SATURATION: 96 % | HEART RATE: 63 BPM | SYSTOLIC BLOOD PRESSURE: 99 MMHG

## 2023-06-19 PROCEDURE — 95800 SLP STDY UNATTENDED: CPT

## 2023-06-19 PROCEDURE — 99203 OFFICE O/P NEW LOW 30 MIN: CPT

## 2023-06-19 RX ORDER — SULFAMETHOXAZOLE AND TRIMETHOPRIM 800; 160 MG/1; MG/1
800-160 TABLET ORAL TWICE DAILY
Qty: 2 | Refills: 0 | Status: DISCONTINUED | COMMUNITY
Start: 2023-03-17 | End: 2023-06-19

## 2023-06-19 NOTE — PHYSICAL EXAM
[General Appearance - Well Developed] : well developed [Normal Appearance] : normal appearance [Well Groomed] : well groomed [General Appearance - Well Nourished] : well nourished [No Deformities] : no deformities [General Appearance - In No Acute Distress] : no acute distress [Normal Conjunctiva] : the conjunctiva exhibited no abnormalities [Eyelids - No Xanthelasma] : the eyelids demonstrated no xanthelasmas [Normal Oropharynx] : normal oropharynx [Heart Rate And Rhythm] : heart rate was normal and rhythm regular [Heart Sounds] : normal S1 and S2 [Heart Sounds Gallop] : no gallops [Murmurs] : no murmurs [Heart Sounds Pericardial Friction Rub] : no pericardial rub [] : no respiratory distress [Auscultation Breath Sounds / Voice Sounds] : lungs were clear to auscultation bilaterally [FreeTextEntry1] : Long palette

## 2023-06-19 NOTE — HISTORY OF PRESENT ILLNESS
[FreeTextEntry1] : KATELYNN REDMOND is a 72 year male, with history of Obstructive sleep apnea, stable atrial fibrillation, Hypertension, Hyperlipidemia, stable lung nodules, who presents to the office for an initial evaluation. Patient reports that he is feeling well overall with no respiratory complaints. He states of having a [employer] Dreamstation CPAP machine for sleep apnea treatment which he has not used machine in 2 years. Patient reports that he has recently received a replacement CPAP machine from [employer]. He states that CPAP treatment was effective for him when he used it previously. Patient denies of having any cigarette smoking history. \par \par Device: [employer] Dreamstation= no recent usage\par \par Settings: 4-20\par \par Mask: Nasal pillow\par \par He has an extensive cardiac history.  This includes hyperlipidemia atrial fibrillation dilated aortic root coronary calcification.

## 2023-06-19 NOTE — ASSESSMENT
[FreeTextEntry1] : Mr. KATELYNN REDMOND is an 72 year old male with obstructive sleep apnea.  Tolerated CPAP in the past but does not feel comfortable resuming treatment with [employer] equipment because he was provided with a replacement that was the same model as the original unit that had a problem.  Based on what I can tell from the [employer] website at this point it has been more than 5 years since his original device prescription and he will be eligible for a replacement.  I do suggest repeating his home sleep study and this was arranged for today

## 2023-06-19 NOTE — PROCEDURE
[FreeTextEntry1] : Sleep study December 2021 provided by patient demonstrates moderate MADELEINE AHI 25 events per hour

## 2023-06-20 ENCOUNTER — APPOINTMENT (OUTPATIENT)
Dept: ELECTROPHYSIOLOGY | Facility: CLINIC | Age: 73
End: 2023-06-20
Payer: MEDICARE

## 2023-06-20 ENCOUNTER — NON-APPOINTMENT (OUTPATIENT)
Age: 73
End: 2023-06-20

## 2023-06-20 PROCEDURE — 95800 SLP STDY UNATTENDED: CPT

## 2023-06-20 PROCEDURE — G2066: CPT

## 2023-06-20 PROCEDURE — 93298 REM INTERROG DEV EVAL SCRMS: CPT

## 2023-06-22 ENCOUNTER — NON-APPOINTMENT (OUTPATIENT)
Age: 73
End: 2023-06-22

## 2023-06-22 ENCOUNTER — APPOINTMENT (OUTPATIENT)
Dept: CARDIOLOGY | Facility: CLINIC | Age: 73
End: 2023-06-22
Payer: MEDICARE

## 2023-06-22 VITALS
HEIGHT: 70 IN | DIASTOLIC BLOOD PRESSURE: 58 MMHG | WEIGHT: 183 LBS | BODY MASS INDEX: 26.2 KG/M2 | HEART RATE: 58 BPM | OXYGEN SATURATION: 99 % | SYSTOLIC BLOOD PRESSURE: 110 MMHG

## 2023-06-22 PROCEDURE — 99214 OFFICE O/P EST MOD 30 MIN: CPT

## 2023-06-22 PROCEDURE — 93306 TTE W/DOPPLER COMPLETE: CPT

## 2023-06-22 PROCEDURE — 93000 ELECTROCARDIOGRAM COMPLETE: CPT

## 2023-06-22 NOTE — CARDIOLOGY SUMMARY
[de-identified] : 6/22/23  sinus bradycardia 54 BPM normal QT  ( out side ekg reviewed ) [de-identified] : 12/1/20 normal chemical nuclear stress test [de-identified] : 12/18/20  EF 55-60% mild dilated aortic root 3.9 cm ascending aorta 3.8 cm  mild MR

## 2023-06-22 NOTE — HISTORY OF PRESENT ILLNESS
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,  [facility] 6 months ago  s/p ILR implantation 7/2021 came for follow up ,  says he is feeling fine , no recurrence of palpitation     recent ILR interrogation ,no events ,\par \par Patient remain in sinus rhythm on Multaq , his blood pressure is controlled current dose of medication  , blood work showed  controlled lipids  normal  TSh \par \par Patient denies any chest pain or shortness of breath or palpitation , patient is physically active , \par \par Patient has hx of sleep apnea  his cpap is broken , awaiting for new machine      his blood pressure is controlled  patient had normal lipid profile  LDL 77 \par \par ekg showed in sinus bradycardia , reviewed \par \par Patient had  gross hematuria , due to bladder carcinoma  resolved,  taking eliquis to 5 mg po  BID

## 2023-06-29 ENCOUNTER — APPOINTMENT (OUTPATIENT)
Dept: PULMONOLOGY | Facility: CLINIC | Age: 73
End: 2023-06-29

## 2023-07-05 ENCOUNTER — APPOINTMENT (OUTPATIENT)
Dept: PULMONOLOGY | Facility: CLINIC | Age: 73
End: 2023-07-05
Payer: MEDICARE

## 2023-07-05 VITALS
WEIGHT: 183 LBS | SYSTOLIC BLOOD PRESSURE: 134 MMHG | BODY MASS INDEX: 26.2 KG/M2 | DIASTOLIC BLOOD PRESSURE: 60 MMHG | OXYGEN SATURATION: 99 % | HEART RATE: 69 BPM | HEIGHT: 70 IN

## 2023-07-05 PROCEDURE — 99213 OFFICE O/P EST LOW 20 MIN: CPT

## 2023-07-05 NOTE — HISTORY OF PRESENT ILLNESS
[TextBox_4] : Followup for sleep apnea\par Patient here to review results of home sleep study.\par No change in history as reported on initial evaluation\par As noted he did receive replacement PAP from [employer] but he is unwilling to use it.

## 2023-07-05 NOTE — ASSESSMENT
[FreeTextEntry1] : MADELEINE, previously treated with auto CPAP.  Wishes to resume treatment and is eligible for replacement device under insurance will order ResMed S11.  Previous prescription was the default 4-20.  Based on data download from previous device a more narrow pressure range is appropriate will order pressure 7-12.

## 2023-08-09 ENCOUNTER — APPOINTMENT (OUTPATIENT)
Dept: ELECTROPHYSIOLOGY | Facility: CLINIC | Age: 73
End: 2023-08-09
Payer: MEDICARE

## 2023-08-09 VITALS
DIASTOLIC BLOOD PRESSURE: 68 MMHG | BODY MASS INDEX: 25.91 KG/M2 | SYSTOLIC BLOOD PRESSURE: 120 MMHG | HEIGHT: 70 IN | WEIGHT: 181 LBS | OXYGEN SATURATION: 99 % | HEART RATE: 60 BPM

## 2023-08-09 PROCEDURE — 99212 OFFICE O/P EST SF 10 MIN: CPT

## 2023-08-09 PROCEDURE — 93285 PRGRMG DEV EVAL SCRMS IP: CPT

## 2023-08-09 NOTE — HISTORY OF PRESENT ILLNESS
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  Patient denies any CP, palpitations, SOB, dizziness, lightheadedness, near syncope or syncope.

## 2023-08-09 NOTE — ASSESSMENT
[FreeTextEntry1] : ILR with good battery status, 5 PAF episodes longest episode last 12 hours, no bradycardia, pauses noted.

## 2023-08-09 NOTE — DISCUSSION/SUMMARY
[FreeTextEntry1] : Continue Multaq and Eliquis Monthly remote monitoring In office follow up in six months

## 2023-09-06 ENCOUNTER — APPOINTMENT (OUTPATIENT)
Dept: PULMONOLOGY | Facility: CLINIC | Age: 73
End: 2023-09-06
Payer: MEDICARE

## 2023-09-06 ENCOUNTER — APPOINTMENT (OUTPATIENT)
Dept: ELECTROPHYSIOLOGY | Facility: CLINIC | Age: 73
End: 2023-09-06
Payer: MEDICARE

## 2023-09-06 VITALS
SYSTOLIC BLOOD PRESSURE: 125 MMHG | HEIGHT: 70 IN | OXYGEN SATURATION: 97 % | HEART RATE: 69 BPM | BODY MASS INDEX: 25.91 KG/M2 | WEIGHT: 181 LBS | DIASTOLIC BLOOD PRESSURE: 65 MMHG

## 2023-09-06 PROCEDURE — 99213 OFFICE O/P EST LOW 20 MIN: CPT

## 2023-09-06 NOTE — HISTORY OF PRESENT ILLNESS
[TextBox_4] : Here for MADELEINE followup. Reports no current respiratory symptoms or sleep symptoms. Using PAP on a nightly basis without difficulty. Reports no symptoms of daytime sleepiness. Getting supplies regularly.   Device: [employer]  Vendor: [employer]  Settin-12  Mask: Nasal pillow  Issues: Tried fullface mask and did not tolerate went back to his old nasal pillow

## 2023-09-06 NOTE — PROCEDURE
[FreeTextEntry1] : Compliance data reviewed.  Excellent compliance noted. AHI overall 6.5 but this appears to reflect the initial poorly successful data with the fullface mask is more recent AHI for the last 2 weeks is 3 events per hour

## 2023-09-06 NOTE — ASSESSMENT
[FreeTextEntry1] : Patient is currently on treatment with PAP. Data download confirms excellent compliance. Patient continues to use treatment and is benefiting from it.  I demonstrated the use of a [employer] nasal interface and he found this very comfortable.  He should continue with this interface follow-up in 6 months

## 2023-09-07 ENCOUNTER — NON-APPOINTMENT (OUTPATIENT)
Age: 73
End: 2023-09-07

## 2023-09-08 PROCEDURE — 93298 REM INTERROG DEV EVAL SCRMS: CPT

## 2023-09-08 PROCEDURE — G2066: CPT

## 2023-09-27 ENCOUNTER — APPOINTMENT (OUTPATIENT)
Dept: UROLOGY | Facility: CLINIC | Age: 73
End: 2023-09-27
Payer: MEDICARE

## 2023-09-27 ENCOUNTER — RESULT REVIEW (OUTPATIENT)
Age: 73
End: 2023-09-27

## 2023-09-27 VITALS
WEIGHT: 179 LBS | HEART RATE: 55 BPM | RESPIRATION RATE: 14 BRPM | BODY MASS INDEX: 25.62 KG/M2 | SYSTOLIC BLOOD PRESSURE: 132 MMHG | HEIGHT: 70 IN | DIASTOLIC BLOOD PRESSURE: 47 MMHG | OXYGEN SATURATION: 98 % | TEMPERATURE: 97.8 F

## 2023-09-27 PROCEDURE — 99213 OFFICE O/P EST LOW 20 MIN: CPT | Mod: 25

## 2023-09-27 PROCEDURE — 52214Z: CUSTOM

## 2023-09-28 ENCOUNTER — APPOINTMENT (OUTPATIENT)
Dept: CT IMAGING | Facility: CLINIC | Age: 73
End: 2023-09-28
Payer: MEDICARE

## 2023-09-28 ENCOUNTER — OUTPATIENT (OUTPATIENT)
Dept: OUTPATIENT SERVICES | Facility: HOSPITAL | Age: 73
LOS: 1 days | End: 2023-09-28
Payer: MEDICARE

## 2023-09-28 DIAGNOSIS — Z98.890 OTHER SPECIFIED POSTPROCEDURAL STATES: Chronic | ICD-10-CM

## 2023-09-28 DIAGNOSIS — C67.9 MALIGNANT NEOPLASM OF BLADDER, UNSPECIFIED: ICD-10-CM

## 2023-09-28 PROCEDURE — 74178 CT ABD&PLV WO CNTR FLWD CNTR: CPT | Mod: 26,MH

## 2023-09-28 PROCEDURE — 74178 CT ABD&PLV WO CNTR FLWD CNTR: CPT

## 2023-10-02 ENCOUNTER — APPOINTMENT (OUTPATIENT)
Dept: UROLOGY | Facility: CLINIC | Age: 73
End: 2023-10-02

## 2023-10-03 LAB — CORE LAB BIOPSY: NORMAL

## 2023-10-04 ENCOUNTER — APPOINTMENT (OUTPATIENT)
Dept: UROLOGY | Facility: CLINIC | Age: 73
End: 2023-10-04
Payer: MEDICARE

## 2023-10-04 VITALS
DIASTOLIC BLOOD PRESSURE: 60 MMHG | SYSTOLIC BLOOD PRESSURE: 112 MMHG | TEMPERATURE: 98.2 F | HEART RATE: 60 BPM | OXYGEN SATURATION: 96 % | BODY MASS INDEX: 25.83 KG/M2 | WEIGHT: 180 LBS

## 2023-10-04 DIAGNOSIS — I89.8 OTHER COMPLICATIONS OF PROCEDURES, NOT ELSEWHERE CLASSIFIED, INITIAL ENCOUNTER: ICD-10-CM

## 2023-10-04 DIAGNOSIS — D41.4 NEOPLASM OF UNCERTAIN BEHAVIOR OF BLADDER: ICD-10-CM

## 2023-10-04 DIAGNOSIS — T81.89XA OTHER COMPLICATIONS OF PROCEDURES, NOT ELSEWHERE CLASSIFIED, INITIAL ENCOUNTER: ICD-10-CM

## 2023-10-04 PROCEDURE — 99213 OFFICE O/P EST LOW 20 MIN: CPT

## 2023-10-12 ENCOUNTER — NON-APPOINTMENT (OUTPATIENT)
Age: 73
End: 2023-10-12

## 2023-10-12 ENCOUNTER — APPOINTMENT (OUTPATIENT)
Dept: ELECTROPHYSIOLOGY | Facility: CLINIC | Age: 73
End: 2023-10-12
Payer: MEDICARE

## 2023-10-13 PROCEDURE — G2066: CPT

## 2023-10-13 PROCEDURE — 93298 REM INTERROG DEV EVAL SCRMS: CPT

## 2023-10-26 ENCOUNTER — APPOINTMENT (OUTPATIENT)
Dept: CARDIOLOGY | Facility: CLINIC | Age: 73
End: 2023-10-26
Payer: MEDICARE

## 2023-10-26 ENCOUNTER — NON-APPOINTMENT (OUTPATIENT)
Age: 73
End: 2023-10-26

## 2023-10-26 VITALS
HEART RATE: 64 BPM | DIASTOLIC BLOOD PRESSURE: 40 MMHG | HEIGHT: 70 IN | SYSTOLIC BLOOD PRESSURE: 114 MMHG | BODY MASS INDEX: 26.77 KG/M2 | WEIGHT: 187 LBS | OXYGEN SATURATION: 99 %

## 2023-10-26 PROCEDURE — 99214 OFFICE O/P EST MOD 30 MIN: CPT

## 2023-10-26 PROCEDURE — 93000 ELECTROCARDIOGRAM COMPLETE: CPT

## 2023-10-27 ENCOUNTER — APPOINTMENT (OUTPATIENT)
Dept: CARDIOLOGY | Facility: CLINIC | Age: 73
End: 2023-10-27
Payer: MEDICARE

## 2023-10-27 PROCEDURE — 93306 TTE W/DOPPLER COMPLETE: CPT

## 2023-11-12 NOTE — ED ADULT NURSE NOTE - OBJECTIVE STATEMENT
Goal Outcome Evaluation:           Problem: Adult Inpatient Plan of Care  Goal: Readiness for Transition of Care  11/12/2023 1205 by Norah Fox, RN  Outcome: Adequate for Care Transition  11/12/2023 1108 by Norah Fox, RN  Outcome: Progressing         Plan to discharge today. Discharge instructions given to patient. Family to give ride back home.          Patient evaluated, treated, and discharged by PA. Refer to PA note for assessment.  Discharge instructions given verbally and understood by patient. Self-quarantine and COVID-19 information provided to patient. Unable to sign secondary to COVID-19 PUI.

## 2023-11-16 ENCOUNTER — NON-APPOINTMENT (OUTPATIENT)
Age: 73
End: 2023-11-16

## 2023-11-16 ENCOUNTER — APPOINTMENT (OUTPATIENT)
Dept: ELECTROPHYSIOLOGY | Facility: CLINIC | Age: 73
End: 2023-11-16
Payer: MEDICARE

## 2023-11-16 PROCEDURE — G2066: CPT

## 2023-11-16 PROCEDURE — 93298 REM INTERROG DEV EVAL SCRMS: CPT | Mod: NC

## 2023-12-20 ENCOUNTER — APPOINTMENT (OUTPATIENT)
Dept: ELECTROPHYSIOLOGY | Facility: CLINIC | Age: 73
End: 2023-12-20
Payer: MEDICARE

## 2023-12-21 ENCOUNTER — NON-APPOINTMENT (OUTPATIENT)
Age: 73
End: 2023-12-21

## 2023-12-22 PROCEDURE — G2066: CPT

## 2023-12-22 PROCEDURE — 93298 REM INTERROG DEV EVAL SCRMS: CPT

## 2024-01-25 ENCOUNTER — APPOINTMENT (OUTPATIENT)
Dept: ELECTROPHYSIOLOGY | Facility: CLINIC | Age: 74
End: 2024-01-25
Payer: MEDICARE

## 2024-01-25 ENCOUNTER — NON-APPOINTMENT (OUTPATIENT)
Age: 74
End: 2024-01-25

## 2024-01-26 PROCEDURE — 93298 REM INTERROG DEV EVAL SCRMS: CPT

## 2024-02-23 NOTE — H&P PST ADULT - AIRWAY
PAST MEDICAL HISTORY:  CAD (coronary artery disease) s/p PCI 17 yo    Cerebellar infarct 11/15/2021    Diabetes mellitus     H/O osteomyelitis R great toe    History of BPH     HTN (hypertension)     Hypertension     Osteoarthritis     PAD (peripheral artery disease)     Scoliosis     Type 2 diabetes mellitus     Vertigo      normal

## 2024-02-28 ENCOUNTER — NON-APPOINTMENT (OUTPATIENT)
Age: 74
End: 2024-02-28

## 2024-02-28 ENCOUNTER — APPOINTMENT (OUTPATIENT)
Dept: ELECTROPHYSIOLOGY | Facility: CLINIC | Age: 74
End: 2024-02-28
Payer: MEDICARE

## 2024-02-29 ENCOUNTER — NON-APPOINTMENT (OUTPATIENT)
Age: 74
End: 2024-02-29

## 2024-02-29 ENCOUNTER — APPOINTMENT (OUTPATIENT)
Dept: CARDIOLOGY | Facility: CLINIC | Age: 74
End: 2024-02-29
Payer: MEDICARE

## 2024-02-29 VITALS
BODY MASS INDEX: 26.98 KG/M2 | DIASTOLIC BLOOD PRESSURE: 66 MMHG | HEART RATE: 62 BPM | OXYGEN SATURATION: 99 % | WEIGHT: 188 LBS | SYSTOLIC BLOOD PRESSURE: 126 MMHG

## 2024-02-29 PROCEDURE — 99214 OFFICE O/P EST MOD 30 MIN: CPT

## 2024-02-29 PROCEDURE — 93298 REM INTERROG DEV EVAL SCRMS: CPT

## 2024-02-29 PROCEDURE — 93000 ELECTROCARDIOGRAM COMPLETE: CPT

## 2024-02-29 PROCEDURE — G2211 COMPLEX E/M VISIT ADD ON: CPT

## 2024-02-29 NOTE — CARDIOLOGY SUMMARY
[de-identified] : 2/29/24   sinus bradycardia 53 BPM normal QT  ( out side ekg reviewed ) [de-identified] : 12/1/20 normal chemical nuclear stress test [de-identified] : 10/27/23  EF 55-60% mild dilated aortic root 3.9 cm ascending aorta 3.8 cm  mild MR mild TR  mild to moderate AI

## 2024-02-29 NOTE — HISTORY OF PRESENT ILLNESS
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 atherosclerosis  mild dilated aortic root , coronary calcification on CT chest   syncope  negative EPS at [facility],  [facility] 6 months ago  s/p ILR implantation 7/2021 came for follow up ,  says he is feeling fine , no recurrence of palpitations, recent ILR interrogation ,no events  in jan 2024 ,  Patient denies any chest pain or shortness of breath or dizziness   Patient remain in sinus rhythm on Multaq , his blood pressure is controlled current dose of medication  , blood work showed  controlled lipids  normal  TSH  mild chronic anemia    Patient has hx of sleep apnea  now using  cpap ,   ekg showed in sinus bradycardia , reviewed   Patient had  gross hematuria , due to bladder carcinoma  resolved,  taking eliquis to 5 mg po  BID

## 2024-02-29 NOTE — DISCUSSION/SUMMARY
[FreeTextEntry1] : PAF   no recurrent palpitation , On dronedarone , continue  medication  eliquis 5 mg po BID   Coronary atherosclerosis : normal chemical stress test , normal ventricular function  Mild MR , mild to moderate AI TR : mild dilated aortic size monitor annually   stable on recent echo   HTN : well controlled. Continue BB, ARB, follow low salt diet , continue home BP monitor recommended  HLD controlled continue current medication LDL < 70  Sleep apnea : now using cpap ,    Follow up with 4 months [EKG obtained to assist in diagnosis and management of assessed problem(s)] : EKG obtained to assist in diagnosis and management of assessed problem(s)

## 2024-02-29 NOTE — REVIEW OF SYSTEMS
[Sinus Pressure] : no sinus pressure [Palpitations] : no palpitations [Wheezing] : no wheezing [Nausea] : no nausea [Itching] : no itching [Tremor] : no tremor was seen [Negative] : Heme/Lymph

## 2024-02-29 NOTE — PHYSICAL EXAM
[Well Developed] : well developed [Well Nourished] : well nourished [No Acute Distress] : no acute distress [Normal Venous Pressure] : normal venous pressure [Normal Conjunctiva] : normal conjunctiva [No Carotid Bruit] : no carotid bruit [Normal S1, S2] : normal S1, S2 [No Murmur] : no murmur [No Rub] : no rub [No Gallop] : no gallop [Clear Lung Fields] : clear lung fields [Good Air Entry] : good air entry [Soft] : abdomen soft [No Respiratory Distress] : no respiratory distress  [Non Tender] : non-tender [Normal Bowel Sounds] : normal bowel sounds [Normal Gait] : normal gait [No Edema] : no edema [No Cyanosis] : no cyanosis [No Clubbing] : no clubbing [No Varicosities] : no varicosities [No Rash] : no rash [No Skin Lesions] : no skin lesions [Moves all extremities] : moves all extremities [No Focal Deficits] : no focal deficits [Normal Speech] : normal speech [Alert and Oriented] : alert and oriented [Normal memory] : normal memory

## 2024-03-13 ENCOUNTER — APPOINTMENT (OUTPATIENT)
Dept: ELECTROPHYSIOLOGY | Facility: CLINIC | Age: 74
End: 2024-03-13
Payer: MEDICARE

## 2024-03-13 ENCOUNTER — NON-APPOINTMENT (OUTPATIENT)
Age: 74
End: 2024-03-13

## 2024-03-13 VITALS
DIASTOLIC BLOOD PRESSURE: 60 MMHG | WEIGHT: 188 LBS | OXYGEN SATURATION: 99 % | HEART RATE: 64 BPM | HEIGHT: 70 IN | SYSTOLIC BLOOD PRESSURE: 108 MMHG | BODY MASS INDEX: 26.92 KG/M2

## 2024-03-13 DIAGNOSIS — Z45.09 ENCOUNTER FOR ADJUSTMENT AND MANAGEMENT OF OTHER CARDIAC DEVICE: ICD-10-CM

## 2024-03-13 PROCEDURE — 93285 PRGRMG DEV EVAL SCRMS IP: CPT

## 2024-04-03 ENCOUNTER — APPOINTMENT (OUTPATIENT)
Dept: PULMONOLOGY | Facility: CLINIC | Age: 74
End: 2024-04-03
Payer: MEDICARE

## 2024-04-03 VITALS
OXYGEN SATURATION: 98 % | RESPIRATION RATE: 18 BRPM | DIASTOLIC BLOOD PRESSURE: 78 MMHG | HEART RATE: 59 BPM | HEIGHT: 70 IN | BODY MASS INDEX: 26.48 KG/M2 | SYSTOLIC BLOOD PRESSURE: 125 MMHG | WEIGHT: 185 LBS

## 2024-04-03 PROCEDURE — 99213 OFFICE O/P EST LOW 20 MIN: CPT

## 2024-04-03 NOTE — HISTORY OF PRESENT ILLNESS
[TextBox_4] : Follow-up for sleep apnea history of obstructive sleep apnea.  Recently received new ResMed S11 auto CPAP.  He uses device 5 times per week.  2 nights per week he works an overnight shift at [employer] and he uses his old Adriana machine on those nights.  He reports no complaints of nonrestorative sleep is using the machine on a nightly basis tolerating well.  Only issue he is having with the machine relates to humidification.  He does not fill the reservoir every night.  On some nights uses leftover water from the preceding and on those nights he runs out of water.  I advised him to put in fresh water every night up to the maximal level and to discard any water in the morning that is left in the machine.

## 2024-04-10 ENCOUNTER — APPOINTMENT (OUTPATIENT)
Dept: UROLOGY | Facility: CLINIC | Age: 74
End: 2024-04-10
Payer: MEDICARE

## 2024-04-10 VITALS
HEIGHT: 70 IN | HEART RATE: 61 BPM | BODY MASS INDEX: 26.48 KG/M2 | RESPIRATION RATE: 18 BRPM | TEMPERATURE: 98 F | SYSTOLIC BLOOD PRESSURE: 116 MMHG | DIASTOLIC BLOOD PRESSURE: 52 MMHG | WEIGHT: 185 LBS | OXYGEN SATURATION: 97 %

## 2024-04-10 DIAGNOSIS — C61 MALIGNANT NEOPLASM OF PROSTATE: ICD-10-CM

## 2024-04-10 DIAGNOSIS — C67.9 MALIGNANT NEOPLASM OF BLADDER, UNSPECIFIED: ICD-10-CM

## 2024-04-10 PROCEDURE — 99214 OFFICE O/P EST MOD 30 MIN: CPT | Mod: 25

## 2024-04-10 PROCEDURE — 52000 CYSTOURETHROSCOPY: CPT

## 2024-04-10 RX ORDER — CA/D3/MAG OX/ZINC/COP/MANG/BOR 600 MG-800
250 MCG TABLET,CHEWABLE ORAL
Refills: 0 | Status: ACTIVE | COMMUNITY

## 2024-04-10 RX ORDER — DOCUSATE SODIUM 100 MG/1
CAPSULE ORAL
Refills: 0 | Status: ACTIVE | COMMUNITY

## 2024-04-10 NOTE — PHYSICAL EXAM
[General Appearance - Well Developed] : well developed [General Appearance - Well Nourished] : well nourished [Normal Appearance] : normal appearance [Well Groomed] : well groomed [General Appearance - In No Acute Distress] : no acute distress [Abdomen Soft] : soft [Abdomen Tenderness] : non-tender [Costovertebral Angle Tenderness] : no ~M costovertebral angle tenderness [Urethral Meatus] : meatus normal [Penis Abnormality] : normal circumcised penis [Urinary Bladder Findings] : the bladder was normal on palpation [Scrotum] : the scrotum was normal [Epididymis] : the epididymides were normal [Testes Tenderness] : no tenderness of the testes [Testes Mass (___cm)] : there were no testicular masses [FreeTextEntry1] : coronal Hypospadius, meatal stenosis [Edema] : no peripheral edema [] : no respiratory distress [Respiration, Rhythm And Depth] : normal respiratory rhythm and effort [Exaggerated Use Of Accessory Muscles For Inspiration] : no accessory muscle use [Oriented To Time, Place, And Person] : oriented to person, place, and time [Affect] : the affect was normal [Mood] : the mood was normal [Not Anxious] : not anxious [Normal Station and Gait] : the gait and station were normal for the patient's age [No Focal Deficits] : no focal deficits [No Palpable Adenopathy] : no palpable adenopathy

## 2024-04-10 NOTE — LETTER BODY
[Dear  ___] : Dear  [unfilled], [Consult Letter:] : I had the pleasure of evaluating your patient, [unfilled]. [Please see my note below.] : Please see my note below. [Consult Closing:] : Thank you very much for allowing me to participate in the care of this patient.  If you have any questions, please do not hesitate to contact me. [Sincerely,] : Sincerely, [FreeTextEntry3] : Taj Chen MD

## 2024-04-10 NOTE — HISTORY OF PRESENT ILLNESS
[FreeTextEntry1] : 3/23/2022: Charles Redmond is a 71 year old male that has terminal gross painless intermittent hematuria for the last one year. Hx of radical prostatomy in 2008 for Ca prostate [place] C+Score 6. . 6 months ago A-Fib and started on Eliquis. 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 cystic mass adjacent to the left lateral wall of the bladder; possible lymphocele. Voiding well; has coronal Hypospadius with materies stenosis. Urine cytology negative for high grade malignancy. Will schedule for meatal dilation, cystoscopy and possible biopsy and fulguration in 2 months. Patient is visiting [facility]. Will stop Eliquis 3 days prior to procedure.  05/24/22: S/P CA Bladder. High grade tumor. No muscle in the specimen. Will schedule for cysto and biopsy in 6 weeks.  RTC; 6 weeks for cysto, biopsy and fulguration.   07/13/2022: Mr. REDMOND is a 71 year male who presents today for a follow up for Ca Bladder. .Cystoscopy with shows lesion at the site of resection ? scar or tumor, biopsied and fulgurated. Urine for cytology sent. RTC: 2 weeks for to check Path, UA and culture   07/27/2022: Mr. REDMOND is a 71 year male who presents today for a follow up for Bladder polyp. Had cystoscopy done last visit on 07/13/22 which showed lesions at the site of resection and was biopsied and fulgurated.  Will discuss pathology results today. Pathology results: showing atypical cells. Will do follow up Cystoscopy in 2 months.    71 year old male that has terminal gross painless intermittent hematuria for the last one year. 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 . 6 months ago A-Fib and started on Eliquis. 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 cystic mass adjacent to the left lateral wall of the bladder; possible lymphocele. Voiding well; has coronal Hypospadius with materies stenosis. Urine cytology negative for high grade malignancy. 05/13/22:  CA Bladder. High grade tumor, invasive papillary ca, completely resected. Follow up Cystoscopy atypia on the specimen. Will repeat Cysto in 2 months.     10/19/2022: Mr. REDMOND is a 72 year male who presents today for a follow up for Bladder polyp. Has terminal gross painless intermittent hematuria for the last one year. Hx of radical prostatomy in 2008 for Ca prostate [place] C+Score 6. . 6 months ago A-Fib and started on Eliquis. 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 cystic mass adjacent to the left lateral wall of the bladder; possible lymphocele. Voiding well; has coronal Hypospadius with meatal  stenosis. Urine cytology negative for high grade malignancy. 05/13/22:  CA Bladder. High grade tumor, T1 invasive papillary ca, completely resected. Follow up Cystoscopy atypia on the specimen. Repeating Cysto today. No recurrence. urine sent for cytology.  RTC: 3 months for Cystoscopy with possible biopsy and fulguration       09/27/2023: Mr. REDMOND is a 73 year male who presents today for a follow up for Ca prostate and bladder carcinoma.  Hx of radical proctectomy in 2008 for Ca prostate Fat[place]ata Score 6.  CA Bladder 05/13/22: High grade tumor, T1 invasive papillary ca, completely resected. Cystoscopy last done 03/17/2023. No recurrence.  Here today for cystoscopy.  Asymptomatic. Cystoscopy: external pressure left lateral wall of the bladder. ? Submucosal mass or external mass pressing on the bladder. Mucosa normal. Will get CT scan AP without and with IV contrast.  Polypoid lesions at the bladder neck, biopsied and fulgurated, sent for histopathology Exam.  RTC: after CT scan for review of the CT scan and Path.            10/04/2023: Mr. REDMOND is a 73 year male who presents today for a follow up for Ca Bladder and Prostate   Hx of radical proctectomy in 2008 for Ca prostate [place] Score 6.  CA Bladder 05/13/22: High grade tumor, T1 invasive papillary ca,  completely resected. Last cystoscopy done on 09/27/23.  Asymptomatic. Cystoscopy: external pressure left lateral wall of the bladder.  Submucosal mass or external mass pressing on the bladder. Mucosa normal Polypoid lesions at the bladder neck, biopsied and fulgurated,  sent for histopathology Exam.   Bladder neck biopsy done on 09/27/23:  -Benign urothelial tissue with cystitis cystica.   Here today to review CT results. Done on 09/28/23.   FINDING: KIDNEYS/URETERS: Symmetric renal enhancement. No hydronephrosis.  A 0.8 cm fat attenuation lesion in the left kidney,  likely a small angiomyolipoma.  BLADDER: Trace intravehicular air, may be due to recent catheterization.  Mass effect on the left side of the bladder from a  calcified mass described below. There is no associated hydronephrosis  or ureteral obstruction. REPRODUCTIVE ORGANS: Prostatectomy.  IMPRESSION: Peripherally calcified cystic mass in the pelvis, (Lymphocele)  causing mass effect on the left lateral bladder wall  without hydronephrosis. The differential includes lymphocele  or chronic seroma.  Reviewed CT with pt. Will get Cystoscopy with possible biopsy and fulguration in 6  months   RTC: 6 months: Cystoscopy with possible biopsy and fulguration     04/10/2024: s/p TURB for non invasive TCC, large. Last follow up Cysto in 10/2023: negative. Voiding well. S/P RP for ca 18 yrs. ago. Mild stress incontinence.  Stopped Eliquis 5 dyas ago. Cystoscopy today: no recurrence.  RTC: 3 months for follow up cystoscopy.

## 2024-04-17 ENCOUNTER — NON-APPOINTMENT (OUTPATIENT)
Age: 74
End: 2024-04-17

## 2024-04-17 ENCOUNTER — APPOINTMENT (OUTPATIENT)
Dept: ELECTROPHYSIOLOGY | Facility: CLINIC | Age: 74
End: 2024-04-17
Payer: MEDICARE

## 2024-04-17 PROCEDURE — 93298 REM INTERROG DEV EVAL SCRMS: CPT

## 2024-04-23 NOTE — ED STATDOCS - NSCAREINITIATED _GEN_ER
Cait Serrano) Detail Level: Detailed Depth Of Biopsy: dermis Was A Bandage Applied: Yes Size Of Lesion In Cm: 0.2 Additional Anticipated Plans: If malignant consider Mohs surgery Biopsy Type: H and E Biopsy Method: 15 blade Anesthesia Type: 1% lidocaine with epinephrine Anesthesia Volume In Cc: 0.5 Additional Anesthesia Volume In Cc (Will Not Render If 0): 0 Hemostasis: Jason's Wound Care: Mupirocin Dressing: bandage Destruction After The Procedure: No Type Of Destruction Used: Curettage Curettage Text: The wound bed was treated with curettage after the biopsy was performed. Cryotherapy Text: The wound bed was treated with cryotherapy after the biopsy was performed. Electrodesiccation Text: The wound bed was treated with electrodesiccation after the biopsy was performed. Electrodesiccation And Curettage Text: The wound bed was treated with electrodesiccation and curettage after the biopsy was performed. Silver Nitrate Text: The wound bed was treated with silver nitrate after the biopsy was performed. Lab: -90 Path Notes (To The Dermatopathologist): R/o milia cyst vs nmsc Consent: Verbal consent was obtained and risks were reviewed including but not limited to scarring, infection, bleeding, scabbing, incomplete removal, nerve damage and allergy to anesthesia. Post-Care Instructions: I reviewed with the patient in detail post-care instructions. Patient is to keep the biopsy site dry overnight, and then apply mupirocin twice daily until healed. Patient instructed to perform vinegar and water soaks. Notification Instructions: Patient will be notified of biopsy results. However, patient instructed to call the office if not contacted within 2 weeks. Billing Type: Third-Party Bill Information: Selecting Yes will display possible errors in your note based on the variables you have selected. This validation is only offered as a suggestion for you. PLEASE NOTE THAT THE VALIDATION TEXT WILL BE REMOVED WHEN YOU FINALIZE YOUR NOTE. IF YOU WANT TO FAX A PRELIMINARY NOTE YOU WILL NEED TO TOGGLE THIS TO 'NO' IF YOU DO NOT WANT IT IN YOUR FAXED NOTE.

## 2024-06-12 ENCOUNTER — APPOINTMENT (OUTPATIENT)
Dept: ELECTROPHYSIOLOGY | Facility: CLINIC | Age: 74
End: 2024-06-12
Payer: MEDICARE

## 2024-06-13 ENCOUNTER — NON-APPOINTMENT (OUTPATIENT)
Age: 74
End: 2024-06-13

## 2024-06-13 PROCEDURE — 93298 REM INTERROG DEV EVAL SCRMS: CPT

## 2024-06-27 ENCOUNTER — NON-APPOINTMENT (OUTPATIENT)
Age: 74
End: 2024-06-27

## 2024-06-27 ENCOUNTER — APPOINTMENT (OUTPATIENT)
Dept: CARDIOLOGY | Facility: CLINIC | Age: 74
End: 2024-06-27
Payer: MEDICARE

## 2024-06-27 VITALS
WEIGHT: 190 LBS | HEART RATE: 69 BPM | HEIGHT: 70 IN | OXYGEN SATURATION: 98 % | SYSTOLIC BLOOD PRESSURE: 128 MMHG | BODY MASS INDEX: 27.2 KG/M2 | DIASTOLIC BLOOD PRESSURE: 60 MMHG

## 2024-06-27 DIAGNOSIS — E78.5 HYPERLIPIDEMIA, UNSPECIFIED: ICD-10-CM

## 2024-06-27 DIAGNOSIS — I48.0 PAROXYSMAL ATRIAL FIBRILLATION: ICD-10-CM

## 2024-06-27 DIAGNOSIS — I10 ESSENTIAL (PRIMARY) HYPERTENSION: ICD-10-CM

## 2024-06-27 DIAGNOSIS — I25.10 ATHEROSCLEROTIC HEART DISEASE OF NATIVE CORONARY ARTERY W/OUT ANGINA PECTORIS: ICD-10-CM

## 2024-06-27 DIAGNOSIS — I77.810 THORACIC AORTIC ECTASIA: ICD-10-CM

## 2024-06-27 DIAGNOSIS — G47.33 OBSTRUCTIVE SLEEP APNEA (ADULT) (PEDIATRIC): ICD-10-CM

## 2024-06-27 PROCEDURE — G2211 COMPLEX E/M VISIT ADD ON: CPT

## 2024-06-27 PROCEDURE — 93000 ELECTROCARDIOGRAM COMPLETE: CPT

## 2024-06-27 PROCEDURE — 99214 OFFICE O/P EST MOD 30 MIN: CPT

## 2024-07-10 ENCOUNTER — APPOINTMENT (OUTPATIENT)
Dept: UROLOGY | Facility: CLINIC | Age: 74
End: 2024-07-10
Payer: MEDICARE

## 2024-07-10 VITALS
DIASTOLIC BLOOD PRESSURE: 54 MMHG | TEMPERATURE: 98.5 F | BODY MASS INDEX: 27.2 KG/M2 | HEART RATE: 60 BPM | SYSTOLIC BLOOD PRESSURE: 101 MMHG | WEIGHT: 190 LBS | OXYGEN SATURATION: 97 % | HEIGHT: 70 IN | RESPIRATION RATE: 18 BRPM

## 2024-07-10 DIAGNOSIS — C67.9 MALIGNANT NEOPLASM OF BLADDER, UNSPECIFIED: ICD-10-CM

## 2024-07-10 DIAGNOSIS — D17.71 BENIGN LIPOMATOUS NEOPLASM OF KIDNEY: ICD-10-CM

## 2024-07-10 DIAGNOSIS — Q54.9 HYPOSPADIAS, UNSPECIFIED: ICD-10-CM

## 2024-07-10 DIAGNOSIS — C61 MALIGNANT NEOPLASM OF PROSTATE: ICD-10-CM

## 2024-07-10 PROCEDURE — 52000 CYSTOURETHROSCOPY: CPT

## 2024-07-10 PROCEDURE — 99214 OFFICE O/P EST MOD 30 MIN: CPT | Mod: 25

## 2024-07-15 ENCOUNTER — APPOINTMENT (OUTPATIENT)
Dept: ELECTROPHYSIOLOGY | Facility: CLINIC | Age: 74
End: 2024-07-15
Payer: MEDICARE

## 2024-07-15 ENCOUNTER — NON-APPOINTMENT (OUTPATIENT)
Age: 74
End: 2024-07-15

## 2024-07-15 PROCEDURE — 93298 REM INTERROG DEV EVAL SCRMS: CPT

## 2024-08-19 ENCOUNTER — NON-APPOINTMENT (OUTPATIENT)
Age: 74
End: 2024-08-19

## 2024-08-19 ENCOUNTER — APPOINTMENT (OUTPATIENT)
Dept: ELECTROPHYSIOLOGY | Facility: CLINIC | Age: 74
End: 2024-08-19
Payer: MEDICARE

## 2024-08-19 PROCEDURE — 93298 REM INTERROG DEV EVAL SCRMS: CPT

## 2024-09-11 ENCOUNTER — NON-APPOINTMENT (OUTPATIENT)
Age: 74
End: 2024-09-11

## 2024-09-11 ENCOUNTER — APPOINTMENT (OUTPATIENT)
Dept: ELECTROPHYSIOLOGY | Facility: CLINIC | Age: 74
End: 2024-09-11
Payer: MEDICARE

## 2024-09-11 VITALS
SYSTOLIC BLOOD PRESSURE: 141 MMHG | RESPIRATION RATE: 16 BRPM | WEIGHT: 190 LBS | DIASTOLIC BLOOD PRESSURE: 72 MMHG | OXYGEN SATURATION: 100 % | BODY MASS INDEX: 27.2 KG/M2 | HEIGHT: 70 IN | HEART RATE: 59 BPM

## 2024-09-11 DIAGNOSIS — Z45.09 ENCOUNTER FOR ADJUSTMENT AND MANAGEMENT OF OTHER CARDIAC DEVICE: ICD-10-CM

## 2024-09-11 DIAGNOSIS — I48.0 PAROXYSMAL ATRIAL FIBRILLATION: ICD-10-CM

## 2024-09-11 PROCEDURE — 93285 PRGRMG DEV EVAL SCRMS IP: CPT

## 2024-09-11 PROCEDURE — 99211 OFF/OP EST MAY X REQ PHY/QHP: CPT

## 2024-09-11 NOTE — DISCUSSION/SUMMARY
[FreeTextEntry1] : Continue Multaq and Eliquis Remote follow up every month In office follow up in six months

## 2024-09-11 NOTE — ASSESSMENT
[FreeTextEntry1] : ILR interrogation with good battery status Multiple false tachy episodes One false pause episode False AF episodes AF burden 0%

## 2024-09-11 NOTE — HISTORY OF PRESENT ILLNESS
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  Patient currently maintained on Multaq and Eliquis for PAF.  Presents for routine ILR follow up. Denies any CP, palpitations, SOB, dizziness, lightheadedness.

## 2024-10-16 ENCOUNTER — NON-APPOINTMENT (OUTPATIENT)
Age: 74
End: 2024-10-16

## 2024-10-16 ENCOUNTER — APPOINTMENT (OUTPATIENT)
Dept: ELECTROPHYSIOLOGY | Facility: CLINIC | Age: 74
End: 2024-10-16
Payer: MEDICARE

## 2024-10-16 PROCEDURE — 93298 REM INTERROG DEV EVAL SCRMS: CPT

## 2024-11-20 ENCOUNTER — APPOINTMENT (OUTPATIENT)
Dept: ELECTROPHYSIOLOGY | Facility: CLINIC | Age: 74
End: 2024-11-20